# Patient Record
Sex: FEMALE | Race: WHITE | NOT HISPANIC OR LATINO | Employment: UNEMPLOYED | URBAN - METROPOLITAN AREA
[De-identification: names, ages, dates, MRNs, and addresses within clinical notes are randomized per-mention and may not be internally consistent; named-entity substitution may affect disease eponyms.]

---

## 2024-07-01 ENCOUNTER — HOSPITAL ENCOUNTER (INPATIENT)
Facility: CLINIC | Age: 44
LOS: 3 days | Discharge: HOME | End: 2024-07-04
Attending: EMERGENCY MEDICINE | Admitting: STUDENT IN AN ORGANIZED HEALTH CARE EDUCATION/TRAINING PROGRAM
Payer: MEDICAID

## 2024-07-01 ENCOUNTER — APPOINTMENT (OUTPATIENT)
Dept: RADIOLOGY | Facility: CLINIC | Age: 44
End: 2024-07-01
Attending: EMERGENCY MEDICINE
Payer: MEDICAID

## 2024-07-01 DIAGNOSIS — J96.02 ACUTE RESPIRATORY FAILURE WITH HYPOXIA AND HYPERCAPNIA (H): ICD-10-CM

## 2024-07-01 DIAGNOSIS — T78.3XXS ANGIOEDEMA, SEQUELA: Primary | ICD-10-CM

## 2024-07-01 DIAGNOSIS — J45.901 EXACERBATION OF ASTHMA, UNSPECIFIED ASTHMA SEVERITY, UNSPECIFIED WHETHER PERSISTENT: ICD-10-CM

## 2024-07-01 DIAGNOSIS — M54.9 BACK PAIN, UNSPECIFIED BACK LOCATION, UNSPECIFIED BACK PAIN LATERALITY, UNSPECIFIED CHRONICITY: ICD-10-CM

## 2024-07-01 DIAGNOSIS — J96.01 ACUTE RESPIRATORY FAILURE WITH HYPOXIA AND HYPERCAPNIA (H): ICD-10-CM

## 2024-07-01 DIAGNOSIS — F41.9 ANXIETY: ICD-10-CM

## 2024-07-01 LAB
ALBUMIN SERPL BCG-MCNC: 4.1 G/DL (ref 3.5–5.2)
ALP SERPL-CCNC: 87 U/L (ref 40–150)
ALT SERPL W P-5'-P-CCNC: 19 U/L (ref 0–50)
ANION GAP SERPL CALCULATED.3IONS-SCNC: 9 MMOL/L (ref 7–15)
AST SERPL W P-5'-P-CCNC: 28 U/L (ref 0–45)
BASE EXCESS BLDV CALC-SCNC: 6.7 MMOL/L (ref -3–3)
BASOPHILS # BLD AUTO: 0 10E3/UL (ref 0–0.2)
BASOPHILS NFR BLD AUTO: 1 %
BILIRUB SERPL-MCNC: 0.5 MG/DL
BUN SERPL-MCNC: 11.5 MG/DL (ref 6–20)
CALCIUM SERPL-MCNC: 9.4 MG/DL (ref 8.6–10)
CHLORIDE SERPL-SCNC: 101 MMOL/L (ref 98–107)
CREAT SERPL-MCNC: 1.2 MG/DL (ref 0.51–0.95)
CRP SERPL-MCNC: 9.29 MG/L
DEPRECATED HCO3 PLAS-SCNC: 30 MMOL/L (ref 22–29)
EGFRCR SERPLBLD CKD-EPI 2021: 57 ML/MIN/1.73M2
EOSINOPHIL # BLD AUTO: 0.6 10E3/UL (ref 0–0.7)
EOSINOPHIL NFR BLD AUTO: 9 %
ERYTHROCYTE [DISTWIDTH] IN BLOOD BY AUTOMATED COUNT: 12.9 % (ref 10–15)
FLUAV RNA SPEC QL NAA+PROBE: NEGATIVE
FLUBV RNA RESP QL NAA+PROBE: NEGATIVE
GLUCOSE BLDC GLUCOMTR-MCNC: 225 MG/DL (ref 70–99)
GLUCOSE SERPL-MCNC: 130 MG/DL (ref 70–99)
HBA1C MFR BLD: 6.8 %
HCG SERPL QL: NEGATIVE
HCO3 BLDV-SCNC: 33 MMOL/L (ref 21–28)
HCT VFR BLD AUTO: 45.8 % (ref 35–47)
HGB BLD-MCNC: 15.3 G/DL (ref 11.7–15.7)
IMM GRANULOCYTES # BLD: 0 10E3/UL
IMM GRANULOCYTES NFR BLD: 0 %
LYMPHOCYTES # BLD AUTO: 1.9 10E3/UL (ref 0.8–5.3)
LYMPHOCYTES NFR BLD AUTO: 31 %
MCH RBC QN AUTO: 30.5 PG (ref 26.5–33)
MCHC RBC AUTO-ENTMCNC: 33.4 G/DL (ref 31.5–36.5)
MCV RBC AUTO: 91 FL (ref 78–100)
MONOCYTES # BLD AUTO: 0.4 10E3/UL (ref 0–1.3)
MONOCYTES NFR BLD AUTO: 6 %
NEUTROPHILS # BLD AUTO: 3.3 10E3/UL (ref 1.6–8.3)
NEUTROPHILS NFR BLD AUTO: 53 %
NRBC # BLD AUTO: 0 10E3/UL
NRBC BLD AUTO-RTO: 0 /100
NT-PROBNP SERPL-MCNC: <36 PG/ML (ref 0–450)
O2/TOTAL GAS SETTING VFR VENT: 0 %
OXYHGB MFR BLDV: 43 % (ref 70–75)
PCO2 BLDV: 63 MM HG (ref 40–50)
PH BLDV: 7.32 [PH] (ref 7.32–7.43)
PLATELET # BLD AUTO: 281 10E3/UL (ref 150–450)
PO2 BLDV: 26 MM HG (ref 25–47)
POTASSIUM SERPL-SCNC: 3.9 MMOL/L (ref 3.4–5.3)
PROT SERPL-MCNC: 7.1 G/DL (ref 6.4–8.3)
RBC # BLD AUTO: 5.02 10E6/UL (ref 3.8–5.2)
RSV RNA SPEC NAA+PROBE: NEGATIVE
SAO2 % BLDV: 44.1 % (ref 70–75)
SARS-COV-2 RNA RESP QL NAA+PROBE: NEGATIVE
SODIUM SERPL-SCNC: 140 MMOL/L (ref 135–145)
T4 FREE SERPL-MCNC: 0.91 NG/DL (ref 0.9–1.7)
TROPONIN T SERPL HS-MCNC: 9 NG/L
TSH SERPL DL<=0.005 MIU/L-ACNC: 44.8 UIU/ML (ref 0.3–4.2)
WBC # BLD AUTO: 6.2 10E3/UL (ref 4–11)

## 2024-07-01 PROCEDURE — 85025 COMPLETE CBC W/AUTO DIFF WBC: CPT | Performed by: EMERGENCY MEDICINE

## 2024-07-01 PROCEDURE — 250N000013 HC RX MED GY IP 250 OP 250 PS 637: Performed by: STUDENT IN AN ORGANIZED HEALTH CARE EDUCATION/TRAINING PROGRAM

## 2024-07-01 PROCEDURE — 3E043XZ INTRODUCTION OF VASOPRESSOR INTO CENTRAL VEIN, PERCUTANEOUS APPROACH: ICD-10-PCS | Performed by: STUDENT IN AN ORGANIZED HEALTH CARE EDUCATION/TRAINING PROGRAM

## 2024-07-01 PROCEDURE — 83036 HEMOGLOBIN GLYCOSYLATED A1C: CPT | Performed by: STUDENT IN AN ORGANIZED HEALTH CARE EDUCATION/TRAINING PROGRAM

## 2024-07-01 PROCEDURE — 250N000012 HC RX MED GY IP 250 OP 636 PS 637: Performed by: STUDENT IN AN ORGANIZED HEALTH CARE EDUCATION/TRAINING PROGRAM

## 2024-07-01 PROCEDURE — 84443 ASSAY THYROID STIM HORMONE: CPT | Performed by: STUDENT IN AN ORGANIZED HEALTH CARE EDUCATION/TRAINING PROGRAM

## 2024-07-01 PROCEDURE — 96365 THER/PROPH/DIAG IV INF INIT: CPT

## 2024-07-01 PROCEDURE — 83880 ASSAY OF NATRIURETIC PEPTIDE: CPT | Performed by: EMERGENCY MEDICINE

## 2024-07-01 PROCEDURE — 250N000011 HC RX IP 250 OP 636: Performed by: EMERGENCY MEDICINE

## 2024-07-01 PROCEDURE — 84439 ASSAY OF FREE THYROXINE: CPT | Performed by: STUDENT IN AN ORGANIZED HEALTH CARE EDUCATION/TRAINING PROGRAM

## 2024-07-01 PROCEDURE — 82040 ASSAY OF SERUM ALBUMIN: CPT | Performed by: EMERGENCY MEDICINE

## 2024-07-01 PROCEDURE — 71045 X-RAY EXAM CHEST 1 VIEW: CPT

## 2024-07-01 PROCEDURE — 258N000003 HC RX IP 258 OP 636: Performed by: EMERGENCY MEDICINE

## 2024-07-01 PROCEDURE — 82962 GLUCOSE BLOOD TEST: CPT

## 2024-07-01 PROCEDURE — 96375 TX/PRO/DX INJ NEW DRUG ADDON: CPT

## 2024-07-01 PROCEDURE — 96360 HYDRATION IV INFUSION INIT: CPT | Mod: 59

## 2024-07-01 PROCEDURE — 82805 BLOOD GASES W/O2 SATURATION: CPT | Performed by: EMERGENCY MEDICINE

## 2024-07-01 PROCEDURE — 250N000009 HC RX 250: Performed by: EMERGENCY MEDICINE

## 2024-07-01 PROCEDURE — 94640 AIRWAY INHALATION TREATMENT: CPT

## 2024-07-01 PROCEDURE — 86140 C-REACTIVE PROTEIN: CPT | Performed by: STUDENT IN AN ORGANIZED HEALTH CARE EDUCATION/TRAINING PROGRAM

## 2024-07-01 PROCEDURE — 87637 SARSCOV2&INF A&B&RSV AMP PRB: CPT | Performed by: EMERGENCY MEDICINE

## 2024-07-01 PROCEDURE — 84484 ASSAY OF TROPONIN QUANT: CPT | Performed by: EMERGENCY MEDICINE

## 2024-07-01 PROCEDURE — 999N000157 HC STATISTIC RCP TIME EA 10 MIN

## 2024-07-01 PROCEDURE — 99223 1ST HOSP IP/OBS HIGH 75: CPT | Performed by: STUDENT IN AN ORGANIZED HEALTH CARE EDUCATION/TRAINING PROGRAM

## 2024-07-01 PROCEDURE — 99285 EMERGENCY DEPT VISIT HI MDM: CPT

## 2024-07-01 PROCEDURE — 36415 COLL VENOUS BLD VENIPUNCTURE: CPT | Performed by: EMERGENCY MEDICINE

## 2024-07-01 PROCEDURE — 210N000002 HC R&B HEART CARE

## 2024-07-01 PROCEDURE — 96361 HYDRATE IV INFUSION ADD-ON: CPT

## 2024-07-01 PROCEDURE — 93005 ELECTROCARDIOGRAM TRACING: CPT | Performed by: EMERGENCY MEDICINE

## 2024-07-01 PROCEDURE — 84703 CHORIONIC GONADOTROPIN ASSAY: CPT | Performed by: EMERGENCY MEDICINE

## 2024-07-01 PROCEDURE — 250N000013 HC RX MED GY IP 250 OP 250 PS 637: Performed by: EMERGENCY MEDICINE

## 2024-07-01 RX ORDER — ONDANSETRON 2 MG/ML
4 INJECTION INTRAMUSCULAR; INTRAVENOUS EVERY 6 HOURS PRN
Status: DISCONTINUED | OUTPATIENT
Start: 2024-07-01 | End: 2024-07-04 | Stop reason: HOSPADM

## 2024-07-01 RX ORDER — DEXTROSE MONOHYDRATE 25 G/50ML
25-50 INJECTION, SOLUTION INTRAVENOUS
Status: DISCONTINUED | OUTPATIENT
Start: 2024-07-01 | End: 2024-07-04 | Stop reason: HOSPADM

## 2024-07-01 RX ORDER — ONDANSETRON 4 MG/1
4 TABLET, ORALLY DISINTEGRATING ORAL EVERY 6 HOURS PRN
Status: DISCONTINUED | OUTPATIENT
Start: 2024-07-01 | End: 2024-07-04 | Stop reason: HOSPADM

## 2024-07-01 RX ORDER — LEVOTHYROXINE SODIUM 300 MCG
300 TABLET ORAL DAILY
COMMUNITY
Start: 2023-07-21

## 2024-07-01 RX ORDER — DIPHENHYDRAMINE HCL 25 MG
25-50 TABLET ORAL EVERY 6 HOURS PRN
COMMUNITY

## 2024-07-01 RX ORDER — AMOXICILLIN 250 MG
1 CAPSULE ORAL 2 TIMES DAILY PRN
Status: DISCONTINUED | OUTPATIENT
Start: 2024-07-01 | End: 2024-07-04 | Stop reason: HOSPADM

## 2024-07-01 RX ORDER — NICOTINE POLACRILEX 4 MG
15-30 LOZENGE BUCCAL
Status: DISCONTINUED | OUTPATIENT
Start: 2024-07-01 | End: 2024-07-04 | Stop reason: HOSPADM

## 2024-07-01 RX ORDER — ONDANSETRON 4 MG/1
4 TABLET, FILM COATED ORAL ONCE
Status: COMPLETED | OUTPATIENT
Start: 2024-07-01 | End: 2024-07-01

## 2024-07-01 RX ORDER — MAGNESIUM SULFATE HEPTAHYDRATE 40 MG/ML
2 INJECTION, SOLUTION INTRAVENOUS ONCE
Status: COMPLETED | OUTPATIENT
Start: 2024-07-01 | End: 2024-07-01

## 2024-07-01 RX ORDER — ALBUTEROL SULFATE 0.83 MG/ML
SOLUTION RESPIRATORY (INHALATION)
Status: COMPLETED
Start: 2024-07-01 | End: 2024-07-01

## 2024-07-01 RX ORDER — DIPHENHYDRAMINE HYDROCHLORIDE 50 MG/ML
25 INJECTION INTRAMUSCULAR; INTRAVENOUS EVERY 6 HOURS PRN
Status: DISCONTINUED | OUTPATIENT
Start: 2024-07-01 | End: 2024-07-02

## 2024-07-01 RX ORDER — HYDROXYZINE HYDROCHLORIDE 25 MG/1
25-50 TABLET, FILM COATED ORAL EVERY 6 HOURS PRN
COMMUNITY
Start: 2024-03-19

## 2024-07-01 RX ORDER — IBUPROFEN 800 MG/1
800 TABLET, FILM COATED ORAL EVERY 8 HOURS PRN
Status: ON HOLD | COMMUNITY
End: 2024-07-04

## 2024-07-01 RX ORDER — CETIRIZINE HYDROCHLORIDE 10 MG/1
20 TABLET ORAL 2 TIMES DAILY
COMMUNITY
Start: 2024-03-19 | End: 2025-03-19

## 2024-07-01 RX ORDER — IPRATROPIUM BROMIDE AND ALBUTEROL SULFATE 2.5; .5 MG/3ML; MG/3ML
SOLUTION RESPIRATORY (INHALATION)
Status: COMPLETED
Start: 2024-07-01 | End: 2024-07-01

## 2024-07-01 RX ORDER — FAMOTIDINE 20 MG/1
40 TABLET, FILM COATED ORAL 2 TIMES DAILY
Status: DISCONTINUED | OUTPATIENT
Start: 2024-07-01 | End: 2024-07-02

## 2024-07-01 RX ORDER — SENNOSIDES 8.6 MG
650 CAPSULE ORAL EVERY MORNING
COMMUNITY

## 2024-07-01 RX ORDER — IPRATROPIUM BROMIDE AND ALBUTEROL SULFATE 2.5; .5 MG/3ML; MG/3ML
1 SOLUTION RESPIRATORY (INHALATION) EVERY 6 HOURS PRN
COMMUNITY
Start: 2023-09-08

## 2024-07-01 RX ORDER — EPINEPHRINE 0.3 MG/.3ML
0.3 INJECTION SUBCUTANEOUS PRN
Status: ON HOLD | COMMUNITY
Start: 2022-09-25 | End: 2024-07-04

## 2024-07-01 RX ORDER — MONTELUKAST SODIUM 10 MG/1
10 TABLET ORAL
COMMUNITY
Start: 2023-08-08

## 2024-07-01 RX ORDER — METHYLPREDNISOLONE SODIUM SUCCINATE 125 MG/2ML
125 INJECTION, POWDER, LYOPHILIZED, FOR SOLUTION INTRAMUSCULAR; INTRAVENOUS ONCE
Status: COMPLETED | OUTPATIENT
Start: 2024-07-01 | End: 2024-07-01

## 2024-07-01 RX ORDER — DIPHENHYDRAMINE HCL 50 MG
50 CAPSULE ORAL EVERY 6 HOURS PRN
Status: DISCONTINUED | OUTPATIENT
Start: 2024-07-01 | End: 2024-07-02

## 2024-07-01 RX ORDER — HYDROXYZINE HYDROCHLORIDE 25 MG/1
25-50 TABLET, FILM COATED ORAL EVERY 6 HOURS PRN
Status: DISCONTINUED | OUTPATIENT
Start: 2024-07-01 | End: 2024-07-04 | Stop reason: HOSPADM

## 2024-07-01 RX ORDER — CARVEDILOL 6.25 MG/1
12.5 TABLET ORAL 2 TIMES DAILY WITH MEALS
Status: DISCONTINUED | OUTPATIENT
Start: 2024-07-01 | End: 2024-07-04 | Stop reason: HOSPADM

## 2024-07-01 RX ORDER — VITAMIN B COMPLEX
1 TABLET ORAL DAILY
COMMUNITY

## 2024-07-01 RX ORDER — PREDNISONE 20 MG/1
40 TABLET ORAL DAILY
Status: DISCONTINUED | OUTPATIENT
Start: 2024-07-01 | End: 2024-07-04 | Stop reason: HOSPADM

## 2024-07-01 RX ORDER — CETIRIZINE HYDROCHLORIDE 10 MG/1
20 TABLET ORAL 2 TIMES DAILY
Status: DISCONTINUED | OUTPATIENT
Start: 2024-07-01 | End: 2024-07-04 | Stop reason: HOSPADM

## 2024-07-01 RX ORDER — DIPHENHYDRAMINE HCL 25 MG
25-50 TABLET ORAL EVERY 6 HOURS PRN
Status: DISCONTINUED | OUTPATIENT
Start: 2024-07-01 | End: 2024-07-01

## 2024-07-01 RX ORDER — ALBUTEROL SULFATE 90 UG/1
1-2 AEROSOL, METERED RESPIRATORY (INHALATION) EVERY 4 HOURS PRN
Status: DISCONTINUED | OUTPATIENT
Start: 2024-07-01 | End: 2024-07-04 | Stop reason: HOSPADM

## 2024-07-01 RX ORDER — DULAGLUTIDE 1.5 MG/.5ML
1.5 INJECTION, SOLUTION SUBCUTANEOUS
COMMUNITY
Start: 2024-05-24 | End: 2024-07-09

## 2024-07-01 RX ORDER — TRIAMTERENE AND HYDROCHLOROTHIAZIDE 37.5; 25 MG/1; MG/1
1 CAPSULE ORAL DAILY
COMMUNITY
Start: 2024-03-19

## 2024-07-01 RX ORDER — AMOXICILLIN 250 MG
2 CAPSULE ORAL 2 TIMES DAILY PRN
Status: DISCONTINUED | OUTPATIENT
Start: 2024-07-01 | End: 2024-07-04 | Stop reason: HOSPADM

## 2024-07-01 RX ORDER — DIPHENHYDRAMINE HYDROCHLORIDE 50 MG/ML
INJECTION INTRAMUSCULAR; INTRAVENOUS
Status: COMPLETED
Start: 2024-07-01 | End: 2024-07-01

## 2024-07-01 RX ORDER — CARVEDILOL 12.5 MG/1
12.5 TABLET ORAL 2 TIMES DAILY WITH MEALS
COMMUNITY
Start: 2024-03-19 | End: 2025-03-19

## 2024-07-01 RX ORDER — ALBUTEROL SULFATE 0.83 MG/ML
5 SOLUTION RESPIRATORY (INHALATION) ONCE
Status: DISCONTINUED | OUTPATIENT
Start: 2024-07-01 | End: 2024-07-01

## 2024-07-01 RX ORDER — IPRATROPIUM BROMIDE AND ALBUTEROL SULFATE 2.5; .5 MG/3ML; MG/3ML
1 SOLUTION RESPIRATORY (INHALATION) EVERY 6 HOURS PRN
Status: DISCONTINUED | OUTPATIENT
Start: 2024-07-01 | End: 2024-07-04 | Stop reason: HOSPADM

## 2024-07-01 RX ORDER — ACETAMINOPHEN 325 MG/1
650 TABLET ORAL EVERY MORNING
Status: DISCONTINUED | OUTPATIENT
Start: 2024-07-02 | End: 2024-07-04 | Stop reason: HOSPADM

## 2024-07-01 RX ORDER — FAMOTIDINE 40 MG/1
40 TABLET, FILM COATED ORAL 2 TIMES DAILY
COMMUNITY
Start: 2024-03-19

## 2024-07-01 RX ORDER — MAGNESIUM OXIDE 400 MG/1
400 TABLET ORAL DAILY
COMMUNITY

## 2024-07-01 RX ORDER — DULAGLUTIDE 3 MG/.5ML
3 INJECTION, SOLUTION SUBCUTANEOUS
COMMUNITY
Start: 2024-07-11 | End: 2024-08-10

## 2024-07-01 RX ORDER — ALBUTEROL SULFATE 90 UG/1
1-2 AEROSOL, METERED RESPIRATORY (INHALATION) EVERY 4 HOURS PRN
COMMUNITY
Start: 2023-10-12

## 2024-07-01 RX ORDER — MONTELUKAST SODIUM 10 MG/1
10 TABLET ORAL
Status: DISCONTINUED | OUTPATIENT
Start: 2024-07-02 | End: 2024-07-04 | Stop reason: HOSPADM

## 2024-07-01 RX ORDER — TRIAMCINOLONE ACETONIDE 1 MG/G
1 CREAM TOPICAL 2 TIMES DAILY PRN
COMMUNITY
Start: 2022-12-01

## 2024-07-01 RX ORDER — LEVOTHYROXINE SODIUM 100 UG/1
300 TABLET ORAL DAILY
Status: DISCONTINUED | OUTPATIENT
Start: 2024-07-02 | End: 2024-07-04 | Stop reason: HOSPADM

## 2024-07-01 RX ORDER — INSULIN HUMAN 100 [IU]/ML
10-20 INJECTION, SUSPENSION SUBCUTANEOUS
COMMUNITY
Start: 2024-03-25

## 2024-07-01 RX ORDER — ONDANSETRON 4 MG/1
4 TABLET, ORALLY DISINTEGRATING ORAL EVERY 8 HOURS PRN
COMMUNITY
Start: 2024-06-11

## 2024-07-01 RX ADMIN — CETIRIZINE HYDROCHLORIDE 20 MG: 10 TABLET, FILM COATED ORAL at 21:24

## 2024-07-01 RX ADMIN — IPRATROPIUM BROMIDE AND ALBUTEROL SULFATE 3 ML: .5; 3 SOLUTION RESPIRATORY (INHALATION) at 15:28

## 2024-07-01 RX ADMIN — PREDNISONE 40 MG: 20 TABLET ORAL at 21:24

## 2024-07-01 RX ADMIN — ONDANSETRON HYDROCHLORIDE 4 MG: 4 TABLET, FILM COATED ORAL at 16:03

## 2024-07-01 RX ADMIN — MAGNESIUM SULFATE HEPTAHYDRATE 2 G: 40 INJECTION, SOLUTION INTRAVENOUS at 15:40

## 2024-07-01 RX ADMIN — FAMOTIDINE 40 MG: 20 TABLET, FILM COATED ORAL at 21:24

## 2024-07-01 RX ADMIN — ALBUTEROL SULFATE 2.5 MG: 2.5 SOLUTION RESPIRATORY (INHALATION) at 15:40

## 2024-07-01 RX ADMIN — CARVEDILOL 12.5 MG: 6.25 TABLET, FILM COATED ORAL at 21:24

## 2024-07-01 RX ADMIN — RIVAROXABAN 20 MG: 10 TABLET, FILM COATED ORAL at 21:24

## 2024-07-01 RX ADMIN — SODIUM CHLORIDE, POTASSIUM CHLORIDE, SODIUM LACTATE AND CALCIUM CHLORIDE 1000 ML: 600; 310; 30; 20 INJECTION, SOLUTION INTRAVENOUS at 15:56

## 2024-07-01 RX ADMIN — RACEPINEPHRINE HYDROCHLORIDE 0.5 ML: 11.25 SOLUTION RESPIRATORY (INHALATION) at 15:29

## 2024-07-01 RX ADMIN — DIPHENHYDRAMINE HYDROCHLORIDE 50 MG: 50 INJECTION INTRAMUSCULAR; INTRAVENOUS at 15:30

## 2024-07-01 RX ADMIN — INSULIN ASPART 1 UNITS: 100 INJECTION, SOLUTION INTRAVENOUS; SUBCUTANEOUS at 22:00

## 2024-07-01 RX ADMIN — METHYLPREDNISOLONE SODIUM SUCCINATE 125 MG: 125 INJECTION, POWDER, FOR SOLUTION INTRAMUSCULAR; INTRAVENOUS at 15:35

## 2024-07-01 ASSESSMENT — ACTIVITIES OF DAILY LIVING (ADL)
ADLS_ACUITY_SCORE: 35

## 2024-07-01 ASSESSMENT — COLUMBIA-SUICIDE SEVERITY RATING SCALE - C-SSRS
1. IN THE PAST MONTH, HAVE YOU WISHED YOU WERE DEAD OR WISHED YOU COULD GO TO SLEEP AND NOT WAKE UP?: NO
2. HAVE YOU ACTUALLY HAD ANY THOUGHTS OF KILLING YOURSELF IN THE PAST MONTH?: NO
6. HAVE YOU EVER DONE ANYTHING, STARTED TO DO ANYTHING, OR PREPARED TO DO ANYTHING TO END YOUR LIFE?: NO

## 2024-07-01 NOTE — ED NOTES
Pt ambulated to bathroom with SBAx1. Pt ambulated well, complained of generalized weakness and grogginess. Denies SOB or difficulty breathing.

## 2024-07-01 NOTE — ED TRIAGE NOTES
Pt arrives via EMS. Per EMS pt called 911 from car pulled over on the road. Pt on roadtrip when she suddenly developed SOB. Hx angioedema. Pt panicky and spitting/vomiting secretions. MD at bedside in triage.     Triage Assessment (Adult)       Row Name 07/01/24 1549          Triage Assessment    Airway WDL X;airway symptoms     Airway Symptoms stridor     Airway Interventions suctioned secretions/vomitus        Respiratory WDL    Respiratory WDL X;rhythm/pattern     Rhythm/Pattern, Respiratory shortness of breath;tachypneic        Skin Circulation/Temperature WDL    Skin Circulation/Temperature WDL WDL        Cardiac WDL    Cardiac WDL X  HTN        Peripheral/Neurovascular WDL    Peripheral Neurovascular WDL WDL        Cognitive/Neuro/Behavioral WDL    Cognitive/Neuro/Behavioral WDL WDL

## 2024-07-01 NOTE — PROGRESS NOTES
Pt placed on O2 2L, sating 92%, RR 19, BS wheezing, frequent cough, productive, increased aeration post nebs. Pt received Duoneb, racepinephrine, and albuterol neb.     Kenneth Sibley, RT

## 2024-07-01 NOTE — ED PROVIDER NOTES
EMERGENCY DEPARTMENT ENCOUNTER      NAME: Aniyah Delgado  AGE: 44 year old female  YOB: 1980  MRN: [id number]  EVALUATION DATE & TIME: 2024  3:20 PM    PCP: No primary care provider on file.    ED PROVIDER: Stefany Wilkins M.D.      Chief Complaint   Patient presents with    Shortness of Breath       FINAL IMPRESSION:  1. Exacerbation of asthma, unspecified asthma severity, unspecified whether persistent    2. Acute respiratory failure with hypoxia and hypercapnia (H)        ED COURSE & MEDICAL DECISION MAKIN. Shortness of breath.  Differntials include asthma, reactive airway disease, angioedema, medication reaction, viral illness.  No ace inhibitors on board per friend. Wheezing plus some angioedema to uvula and palatine tonsils but no tongue or lip involvement.  Racemic epi, albuterol, DuoNeb, methylprednisolone, IV Benadryl, IV magnesium ordered.  EKG performed.  Chest x-ray ordered.  I reviewed chest x-ray and EKG myself.  I ordered and reviewed blood work myself, essentially negative troponin.  Negative chest x-ray.  Sinus rhythm on EKG.  Still significantly wheezing and requiring 2 L of oxygen.  I spoke to registration, they are registering her now and that is why previous visits are not available.  She typically sees [facility] in [place].  Patient denies any history of asthma exacerbation requiring intubation.  I suspect she has some sleep apnea resulting in CO2 retention reflected in the VBG.  I will admit patient to medicine given requirement of 2 L nasal cannula oxygen, continued wheezing.      3:23 PM I met with the patient to gather history and to perform my initial exam. I discussed the plan for care while in the Emergency Department.  3:50 PM I reevaluated the patient. She states feeling improved.   5:20 PM I reexamined the patient. She is still wheezing but workup breathing improved. She has a history of asthma. No recent illnesses. Sleep study ordered by [facility] in [place].  She sees [facility] but has not had the sleep study yet.   6:20 PM I spoke with Dr. Red, hospitalist        Pertinent Labs & Imaging studies reviewed. (See chart for details).    Medical Decision Making  Obtained supplemental history:Supplemental history obtained?: Documented in chart, EMS, and Friend  Reviewed external records: External records reviewed?: Documented in chart and Other: Medical chart not uploaded before patient was admitted due to lack registration.  I did discuss past medical history with patient, she states all her care is with [facility] in [facility].  Care impacted by chronic illness:Other: asthma  Care significantly affected by social determinants of health:Access to Medical Care  Did you consider but not order tests?: Work up considered but not performed and documented in chart, if applicable I considered CT PE but deferred this test due to clear asthma exacerbation causing significant shortness of breath with improvement after treatment with DuoNeb and albuterol along with IV meds.  Did you interpret images independently?: Independent interpretation of ECG and images noted in documentation, when applicable.  Consultation discussion with other provider:Did you involve another provider (consultant, , pharmacy, etc.)?: I discussed the care with another health care provider, see documentation for details.  Admit.    At the conclusion of the encounter I discussed the results of all of the tests and the disposition. The questions were answered. The patient or family acknowledged understanding and was agreeable with the care plan.      CRITICAL CARE:  Performed by: Stefany Wilkins  Authorized by: Stefany Wilkins  Total critical care time: 40 minutes  Critical care time was exclusive of separately billable procedures and treating other patients.  Critical care was necessary to treat or prevent imminent or life-threatening deterioration of the following conditions: Acute respiratory failure with  "hypoxemia  Critical care was time spent personally by me on the following activities: development of treatment plan with patient or surrogate, discussions with consultants, examination of patient, evaluation of patient's response to treatment, obtaining history from patient or surrogate, ordering and performing treatments and interventions, ordering and review of laboratory studies, ordering and review of radiographic studies and re-evaluation of patient's condition, this excludes any separately billable procedures.    HPI    Patient information was obtained from: EMS, friend, and the patient     Use of : N/A .       Aniyah Delgado is a 44 year old female who presents to the ED by EMS for evaluation of shortness of breath.    Per friend, the patient had an shortness of breath that became worse after taking a medication about 25 minutes ago (~3:00 PM) along with \"spitting up red stuff.\" She was having raspy breaths, soreness, fatigue, and cough before the worsening shortness of breath. Patient does not smoke. She has allergies to fish but did not have any. They are on a road trip from [place].     Per EMS, the patient receive 2 epi pen injections.     Per patient, she has a medical history of asthma.  She denies any history of intubations in the past.      REVIEW OF SYSTEMS  All other systems negative unless noted in HPI.    PAST MEDICAL HISTORY:  History reviewed. No pertinent past medical history.    PAST SURGICAL HISTORY:  History reviewed. No pertinent surgical history.      CURRENT MEDICATIONS:    Current Facility-Administered Medications   Medication Dose Route Frequency Provider Last Rate Last Admin    acetaminophen (TYLENOL) tablet 650 mg  650 mg Oral QAM Hero Red MD   650 mg at 07/02/24 0807    albuterol (PROVENTIL HFA/VENTOLIN HFA) inhaler  1-2 puff Inhalation Q4H PRN Hero Red MD        carvedilol (COREG) tablet 12.5 mg  12.5 mg Oral BID w/meals Hero Red MD   12.5 mg at " 07/02/24 0807    cetirizine (zyrTEC) tablet 20 mg  20 mg Oral BID Hero Red MD   20 mg at 07/02/24 0807    glucose gel 15-30 g  15-30 g Oral Q15 Min PRN Hero Red MD        Or    dextrose 50 % injection 25-50 mL  25-50 mL Intravenous Q15 Min PRN Hero Red MD        Or    glucagon injection 1 mg  1 mg Subcutaneous Q15 Min PRN Hero Red MD        diphenhydrAMINE (BENADRYL) capsule 50 mg  50 mg Oral Q6H PRN Stefany Wilkins MD        Or    diphenhydrAMINE (BENADRYL) injection 25 mg  25 mg Intravenous Q6H PRN Stefany Wilkins MD   25 mg at 07/02/24 1148    famotidine (PEPCID) tablet 40 mg  40 mg Oral BID Hero Red MD   40 mg at 07/02/24 0807    hydrOXYzine HCl (ATARAX) tablet 25-50 mg  25-50 mg Oral Q6H PRN Hero Red MD   50 mg at 07/02/24 1148    insulin aspart (NovoLOG) injection (RAPID ACTING)  1-7 Units Subcutaneous TID  Hero Red MD   3 Units at 07/02/24 1303    insulin aspart (NovoLOG) injection (RAPID ACTING)  1-5 Units Subcutaneous At Bedtime Hero Red MD   1 Units at 07/01/24 2200    ipratropium - albuterol 0.5 mg/2.5 mg/3 mL (DUONEB) neb solution 3 mL  1 vial Nebulization Q6H PRN Hero Red MD        levothyroxine (SYNTHROID/LEVOTHROID) tablet 300 mcg  300 mcg Oral Daily Hero Red MD   300 mcg at 07/02/24 0807    montelukast (SINGULAIR) tablet 10 mg  10 mg Oral QAM  Hero Red MD   10 mg at 07/02/24 0638    naloxone (NARCAN) injection 0.2 mg  0.2 mg Intravenous Q2 Min PRN Hero Red MD        Or    naloxone (NARCAN) injection 0.4 mg  0.4 mg Intravenous Q2 Min PRN Hero Red MD        Or    naloxone (NARCAN) injection 0.2 mg  0.2 mg Intramuscular Q2 Min PRN Hero Red MD        Or    naloxone (NARCAN) injection 0.4 mg  0.4 mg Intramuscular Q2 Min PRN Hero Red MD        ondansetron (ZOFRAN ODT) ODT tab 4 mg  4 mg Oral Q6H PRN Hero Red MD   4 mg at 07/02/24 1038    Or    ondansetron (ZOFRAN) injection 4 mg  4 mg  Intravenous Q6H PRN Hero Red MD        Patient is already receiving anticoagulation with heparin, enoxaparin (LOVENOX), warfarin (COUMADIN)  or other anticoagulant medication   Does not apply Continuous PRN Hero Red MD        predniSONE (DELTASONE) tablet 40 mg  40 mg Oral Daily Hero Red MD   40 mg at 07/02/24 0807    rivaroxaban ANTICOAGULANT (XARELTO) tablet 20 mg  20 mg Oral Daily with supper Hero Red MD   20 mg at 07/01/24 2124    senna-docusate (SENOKOT-S/PERICOLACE) 8.6-50 MG per tablet 1 tablet  1 tablet Oral BID PRN Hero Red MD        Or    senna-docusate (SENOKOT-S/PERICOLACE) 8.6-50 MG per tablet 2 tablet  2 tablet Oral BID PRN Hero Red MD        traMADol (ULTRAM) tablet 50 mg  50 mg Oral Q6H PRN Arvin Bowie DO   50 mg at 07/02/24 0411     Current Outpatient Medications   Medication Sig Dispense Refill    acetaminophen (TYLENOL) 650 MG CR tablet Take 650 mg by mouth every morning      albuterol (PROAIR HFA/PROVENTIL HFA/VENTOLIN HFA) 108 (90 Base) MCG/ACT inhaler Inhale 1-2 puffs into the lungs every 4 hours as needed      carvedilol (COREG) 12.5 MG tablet Take 12.5 mg by mouth 2 times daily (with meals)      cetirizine (ZYRTEC) 10 MG tablet Take 20 mg by mouth 2 times daily      diphenhydrAMINE (BENADRYL) 25 MG tablet Take 25-50 mg by mouth every 6 hours as needed for itching or allergies      dulaglutide (TRULICITY) 1.5 MG/0.5ML pen Inject 1.5 mg Subcutaneous every 7 days      [START ON 7/11/2024] Dulaglutide (TRULICITY) 3 MG/0.5ML SOPN Inject 3 mg Subcutaneous every 7 days      EPINEPHrine (ANY BX GENERIC EQUIV) 0.3 MG/0.3ML injection 2-pack Inject 0.3 mg into the muscle as needed for anaphylaxis      famotidine (PEPCID) 40 MG tablet Take 40 mg by mouth 2 times daily      HUMULIN N KWIKPEN 100 UNIT/ML susp Inject 10-20 Units Subcutaneous 2 times daily (before meals) Inject 10-20 Units under the skin 2 (two) times a day as needed (with meals when taking  "prednisone).      hydrOXYzine HCl (ATARAX) 25 MG tablet Take 25-50 mg by mouth every 6 hours as needed for itching      ibuprofen (ADVIL/MOTRIN) 800 MG tablet Take 800 mg by mouth every 8 hours as needed for moderate pain      ipratropium - albuterol 0.5 mg/2.5 mg/3 mL (DUONEB) 0.5-2.5 (3) MG/3ML neb solution Take 1 vial by nebulization every 6 hours as needed for wheezing or shortness of breath      magnesium oxide 400 MG tablet Take 400 mg by mouth daily      montelukast (SINGULAIR) 10 MG tablet Take 10 mg by mouth daily before breakfast      ondansetron (ZOFRAN ODT) 4 MG ODT tab Take 4 mg by mouth every 8 hours as needed for nausea or vomiting      rivaroxaban ANTICOAGULANT (XARELTO) 20 MG TABS tablet Take 20 mg by mouth daily (with dinner)      SYNTHROID 300 MCG tablet Take 300 mcg by mouth daily      triamcinolone (KENALOG) 0.1 % external cream Apply 1 Application topically 2 times daily as needed (eczema)      triamterene-HCTZ (DYAZIDE) 37.5-25 MG capsule Take 1 capsule by mouth daily      Vitamin D3 (VITAMIN D, CHOLECALCIFEROL,) 25 mcg (1000 units) tablet Take 1 tablet by mouth daily           ALLERGIES:  Allergies   Allergen Reactions    Augmentin [Amoxicillin-Pot Clavulanate]     Lisinopril     Alprazolam Other (See Comments)     Excess sedation    Diltiazem Dizziness       FAMILY HISTORY:  History reviewed. No pertinent family history.    SOCIAL HISTORY:       VITALS:  Patient Vitals for the past 24 hrs:   BP Temp Temp src Pulse Resp SpO2 Height Weight   07/02/24 0753 136/79 97.7  F (36.5  C) Oral 86 18 90 % -- --   07/02/24 0309 111/65 97.5  F (36.4  C) Oral -- 18 98 % -- --   07/01/24 2324 (!) 144/67 97.5  F (36.4  C) Axillary -- 18 90 % -- --   07/01/24 2236 -- -- -- 81 -- 92 % -- --   07/01/24 2229 -- -- -- 84 -- (!) 89 % -- --   07/01/24 2124 (!) 153/94 -- -- 87 -- -- -- --   07/01/24 2122 -- -- -- 91 -- 92 % -- --   07/01/24 2100 -- -- -- -- -- -- 1.6 m (5' 2.99\") --   07/01/24 2000 -- -- -- 84 -- 93 " "% -- --   07/01/24 1948 139/88 -- -- 80 18 93 % -- --   07/01/24 1943 -- -- -- -- -- -- -- 145.2 kg (320 lb)   07/01/24 1935 -- -- -- 83 -- 96 % -- --   07/01/24 1745 136/80 -- -- 74 14 92 % -- --   07/01/24 1645 (!) 140/72 -- -- 71 19 94 % -- --   07/01/24 1630 (!) 146/81 -- -- 73 22 97 % -- --   07/01/24 1548 (!) 158/107 97.1  F (36.2  C) Temporal 75 20 100 % -- --   07/01/24 1528 -- -- -- -- -- 92 % -- --       PHYSICAL EXAM    VITAL SIGNS: /79 (BP Location: Left arm)   Pulse 86   Temp 97.7  F (36.5  C) (Oral)   Resp 18   Ht 1.6 m (5' 2.99\")   Wt 145.2 kg (320 lb)   SpO2 90%   BMI 56.70 kg/m    Physical Exam  Vitals and nursing note reviewed.   Constitutional:       General: She is in acute distress.      Appearance: She is ill-appearing and toxic-appearing.   HENT:      Head: Normocephalic and atraumatic.      Mouth/Throat:      Comments: Mild posterior pharyngeal erythema, edema noted to the uvula and tonsils, soft palate.  Uvula is midline.  Maintaining secretions.  No edema to the tongue or lips.    Eyes:      General: No scleral icterus.        Right eye: No discharge.         Left eye: No discharge.      Pupils: Pupils are equal, round, and reactive to light.   Cardiovascular:      Rate and Rhythm: Regular rhythm. Tachycardia present.   Pulmonary:      Effort: Respiratory distress present.      Breath sounds: Wheezing present.      Comments: Retractions present on initial presentation, good air movement bilaterally.  No stridor.  Abdominal:      General: There is no distension.      Palpations: Abdomen is soft.      Tenderness: There is no abdominal tenderness.   Musculoskeletal:         General: No swelling or deformity.      Cervical back: Neck supple. No rigidity.   Skin:     General: Skin is warm and dry.      Capillary Refill: Capillary refill takes less than 2 seconds.      Findings: No bruising or erythema.   Neurological:      General: No focal deficit present.      Mental Status: She " is oriented to person, place, and time. Mental status is at baseline.   Psychiatric:         Mood and Affect: Mood normal.         Behavior: Behavior normal.         LABS  Labs Ordered and Resulted from Time of ED Arrival to Time of ED Departure   BLOOD GAS VENOUS - Abnormal       Result Value    pH Venous 7.32      pCO2 Venous 63 (*)     pO2 Venous 26      Bicarbonate Venous 33 (*)     Base Excess/Deficit Venous 6.7 (*)     FIO2 0      Oxyhemoglobin Venous 43 (*)     O2 Sat, Venous 44.1 (*)    COMPREHENSIVE METABOLIC PANEL - Abnormal    Sodium 140      Potassium 3.9      Carbon Dioxide (CO2) 30 (*)     Anion Gap 9      Urea Nitrogen 11.5      Creatinine 1.20 (*)     GFR Estimate 57 (*)     Calcium 9.4      Chloride 101      Glucose 130 (*)     Alkaline Phosphatase 87      AST 28      ALT 19      Protein Total 7.1      Albumin 4.1      Bilirubin Total 0.5     GLUCOSE BY METER - Abnormal    GLUCOSE BY METER POCT 225 (*)    CRP INFLAMMATION - Abnormal    CRP Inflammation 9.29 (*)    TSH WITH FREE T4 REFLEX - Abnormal    TSH 44.80 (*)    HEMOGLOBIN A1C - Abnormal    Hemoglobin A1C 6.8 (*)    COMPREHENSIVE METABOLIC PANEL - Abnormal    Sodium 137      Potassium 3.9      Carbon Dioxide (CO2) 25      Anion Gap 12      Urea Nitrogen 12.4      Creatinine 0.91      GFR Estimate 80      Calcium 9.2      Chloride 100      Glucose 239 (*)     Alkaline Phosphatase 80      AST 22      ALT 13      Protein Total 6.8      Albumin 3.9      Bilirubin Total 0.4     GLUCOSE BY METER - Abnormal    GLUCOSE BY METER POCT 213 (*)    CBC WITH PLATELETS AND DIFFERENTIAL - Abnormal    WBC Count 7.0      RBC Count 4.80      Hemoglobin 14.9      Hematocrit 43.5      MCV 91      MCH 31.0      MCHC 34.3      RDW 12.6      Platelet Count 255      % Neutrophils 92      % Lymphocytes 7      % Monocytes 0      % Eosinophils 0      % Basophils 0      % Immature Granulocytes 1      NRBCs per 100 WBC 0      Absolute Neutrophils 6.4      Absolute  Lymphocytes 0.5 (*)     Absolute Monocytes 0.0      Absolute Eosinophils 0.0      Absolute Basophils 0.0      Absolute Immature Granulocytes 0.0      Absolute NRBCs 0.0     GLUCOSE BY METER - Abnormal    GLUCOSE BY METER POCT 234 (*)    HCG QUALITATIVE PREGNANCY - Normal    hCG Serum Qualitative Negative     NT PROBNP INPATIENT - Normal    N terminal Pro BNP Inpatient <36     TROPONIN T, HIGH SENSITIVITY - Normal    Troponin T, High Sensitivity 9     INFLUENZA A/B, RSV, & SARS-COV2 PCR - Normal    Influenza A PCR Negative      Influenza B PCR Negative      RSV PCR Negative      SARS CoV2 PCR Negative     T4 FREE - Normal    Free T4 0.91     CBC WITH PLATELETS AND DIFFERENTIAL    WBC Count 6.2      RBC Count 5.02      Hemoglobin 15.3      Hematocrit 45.8      MCV 91      MCH 30.5      MCHC 33.4      RDW 12.9      Platelet Count 281      % Neutrophils 53      % Lymphocytes 31      % Monocytes 6      % Eosinophils 9      % Basophils 1      % Immature Granulocytes 0      NRBCs per 100 WBC 0      Absolute Neutrophils 3.3      Absolute Lymphocytes 1.9      Absolute Monocytes 0.4      Absolute Eosinophils 0.6      Absolute Basophils 0.0      Absolute Immature Granulocytes 0.0      Absolute NRBCs 0.0     GLUCOSE MONITOR NURSING POCT   GLUCOSE MONITOR NURSING POCT   GLUCOSE MONITOR NURSING POCT   GLUCOSE MONITOR NURSING POCT   GLUCOSE MONITOR NURSING POCT   GLUCOSE MONITOR NURSING POCT         RADIOLOGY  XR Chest Port 1 View   Final Result   IMPRESSION: Negative chest.         I have independently reviewed the above image. See radiology report for detail.      EKG:    NA       PROCEDURES:  N/A      MEDICATIONS GIVEN IN THE EMERGENCY:  Medications   diphenhydrAMINE (BENADRYL) capsule 50 mg ( Oral See Alternative 7/2/24 1148)     Or   diphenhydrAMINE (BENADRYL) injection 25 mg (25 mg Intravenous $Given 7/2/24 1148)   senna-docusate (SENOKOT-S/PERICOLACE) 8.6-50 MG per tablet 1 tablet (has no administration in time range)      Or   senna-docusate (SENOKOT-S/PERICOLACE) 8.6-50 MG per tablet 2 tablet (has no administration in time range)   ondansetron (ZOFRAN ODT) ODT tab 4 mg (4 mg Oral $Given 7/2/24 1038)     Or   ondansetron (ZOFRAN) injection 4 mg ( Intravenous See Alternative 7/2/24 1038)   Patient is already receiving anticoagulation with heparin, enoxaparin (LOVENOX), warfarin (COUMADIN)  or other anticoagulant medication (has no administration in time range)   acetaminophen (TYLENOL) tablet 650 mg (650 mg Oral $Given 7/2/24 0807)   albuterol (PROVENTIL HFA/VENTOLIN HFA) inhaler (has no administration in time range)   carvedilol (COREG) tablet 12.5 mg (12.5 mg Oral $Given 7/2/24 0807)   cetirizine (zyrTEC) tablet 20 mg (20 mg Oral $Given 7/2/24 0807)   famotidine (PEPCID) tablet 40 mg (40 mg Oral $Given 7/2/24 0807)   hydrOXYzine HCl (ATARAX) tablet 25-50 mg (50 mg Oral $Given 7/2/24 1148)   ipratropium - albuterol 0.5 mg/2.5 mg/3 mL (DUONEB) neb solution 3 mL (has no administration in time range)   montelukast (SINGULAIR) tablet 10 mg (10 mg Oral $Given 7/2/24 0638)   rivaroxaban ANTICOAGULANT (XARELTO) tablet 20 mg (20 mg Oral $Given 7/1/24 2124)   levothyroxine (SYNTHROID/LEVOTHROID) tablet 300 mcg (300 mcg Oral $Given 7/2/24 0807)   predniSONE (DELTASONE) tablet 40 mg (40 mg Oral $Given 7/2/24 0807)   glucose gel 15-30 g (has no administration in time range)     Or   dextrose 50 % injection 25-50 mL (has no administration in time range)     Or   glucagon injection 1 mg (has no administration in time range)   insulin aspart (NovoLOG) injection (RAPID ACTING) (3 Units Subcutaneous $Given 7/2/24 1303)   insulin aspart (NovoLOG) injection (RAPID ACTING) (1 Units Subcutaneous $Given 7/1/24 8050)   traMADol (ULTRAM) tablet 50 mg (50 mg Oral $Given 7/2/24 3859)   naloxone (NARCAN) injection 0.2 mg (has no administration in time range)     Or   naloxone (NARCAN) injection 0.4 mg (has no administration in time range)     Or   naloxone  (NARCAN) injection 0.2 mg (has no administration in time range)     Or   naloxone (NARCAN) injection 0.4 mg (has no administration in time range)   racEPINEPHrine 2.25 % neb solution (0.5 mLs  $Given 7/1/24 1529)   ipratropium - albuterol 0.5 mg/2.5 mg/3 mL (DUONEB) 0.5-2.5 (3) MG/3ML neb solution (3 mLs  $Given 7/1/24 1528)   diphenhydrAMINE (BENADRYL) 50 MG/ML injection (50 mg  $Given 7/1/24 1530)   methylPREDNISolone sodium succinate (solu-MEDROL) injection 125 mg (125 mg Intravenous $Given 7/1/24 1535)   magnesium sulfate 2 g in 50 mL sterile water intermittent infusion (0 g Intravenous Stopped 7/1/24 1646)   ondansetron (ZOFRAN) tablet 4 mg (4 mg Oral $Given 7/1/24 1603)   albuterol (PROVENTIL) (2.5 MG/3ML) 0.083% neb solution (2.5 mg  $Given 7/1/24 1540)   lactated ringers BOLUS 1,000 mL (0 mLs Intravenous Stopped 7/1/24 2109)       NEW PRESCRIPTIONS STARTED AT TODAY'S ER VISIT  New Prescriptions    No medications on file        I, Newton Li, am serving as a scribe to document services personally performed by Stefany Wilkins MD, based on my observations and the provider's statements to me.  I, Stefany Wilkins MD, attest that Newton Li is acting in a scribe capacity, has observed my performance of the services and has documented them in accordance with my direction.     Stefany Wilkins MD  Emergency Medicine  [facility] EMERGENCY ROOM  [address] [address] [phone]  Dept: [phone]             Stefany Wilkins MD  07/02/24 5156

## 2024-07-02 LAB
ALBUMIN SERPL BCG-MCNC: 3.9 G/DL (ref 3.5–5.2)
ALP SERPL-CCNC: 80 U/L (ref 40–150)
ALT SERPL W P-5'-P-CCNC: 13 U/L (ref 0–50)
ANION GAP SERPL CALCULATED.3IONS-SCNC: 12 MMOL/L (ref 7–15)
AST SERPL W P-5'-P-CCNC: 22 U/L (ref 0–45)
BASE EXCESS BLDV CALC-SCNC: 2.5 MMOL/L (ref -3–3)
BASOPHILS # BLD AUTO: 0 10E3/UL (ref 0–0.2)
BASOPHILS NFR BLD AUTO: 0 %
BILIRUB SERPL-MCNC: 0.4 MG/DL
BUN SERPL-MCNC: 12.4 MG/DL (ref 6–20)
CALCIUM SERPL-MCNC: 9.2 MG/DL (ref 8.6–10)
CHLORIDE SERPL-SCNC: 100 MMOL/L (ref 98–107)
CREAT SERPL-MCNC: 0.91 MG/DL (ref 0.51–0.95)
DEPRECATED HCO3 PLAS-SCNC: 25 MMOL/L (ref 22–29)
EGFRCR SERPLBLD CKD-EPI 2021: 80 ML/MIN/1.73M2
EOSINOPHIL # BLD AUTO: 0 10E3/UL (ref 0–0.7)
EOSINOPHIL NFR BLD AUTO: 0 %
ERYTHROCYTE [DISTWIDTH] IN BLOOD BY AUTOMATED COUNT: 12.6 % (ref 10–15)
GLUCOSE BLDC GLUCOMTR-MCNC: 213 MG/DL (ref 70–99)
GLUCOSE BLDC GLUCOMTR-MCNC: 234 MG/DL (ref 70–99)
GLUCOSE BLDC GLUCOMTR-MCNC: 245 MG/DL (ref 70–99)
GLUCOSE BLDC GLUCOMTR-MCNC: 271 MG/DL (ref 70–99)
GLUCOSE SERPL-MCNC: 239 MG/DL (ref 70–99)
HCO3 BLDV-SCNC: 27 MMOL/L (ref 21–28)
HCT VFR BLD AUTO: 43.5 % (ref 35–47)
HGB BLD-MCNC: 14.9 G/DL (ref 11.7–15.7)
IMM GRANULOCYTES # BLD: 0 10E3/UL
IMM GRANULOCYTES NFR BLD: 1 %
LYMPHOCYTES # BLD AUTO: 0.5 10E3/UL (ref 0.8–5.3)
LYMPHOCYTES NFR BLD AUTO: 7 %
MCH RBC QN AUTO: 31 PG (ref 26.5–33)
MCHC RBC AUTO-ENTMCNC: 34.3 G/DL (ref 31.5–36.5)
MCV RBC AUTO: 91 FL (ref 78–100)
MONOCYTES # BLD AUTO: 0 10E3/UL (ref 0–1.3)
MONOCYTES NFR BLD AUTO: 0 %
NEUTROPHILS # BLD AUTO: 6.4 10E3/UL (ref 1.6–8.3)
NEUTROPHILS NFR BLD AUTO: 92 %
NRBC # BLD AUTO: 0 10E3/UL
NRBC BLD AUTO-RTO: 0 /100
O2/TOTAL GAS SETTING VFR VENT: 0 %
OXYHGB MFR BLDV: 89 % (ref 70–75)
PCO2 BLDV: 40 MM HG (ref 40–50)
PH BLDV: 7.43 [PH] (ref 7.32–7.43)
PLATELET # BLD AUTO: 255 10E3/UL (ref 150–450)
PO2 BLDV: 55 MM HG (ref 25–47)
POTASSIUM SERPL-SCNC: 3.9 MMOL/L (ref 3.4–5.3)
PROT SERPL-MCNC: 6.8 G/DL (ref 6.4–8.3)
RBC # BLD AUTO: 4.8 10E6/UL (ref 3.8–5.2)
SAO2 % BLDV: 90.6 % (ref 70–75)
SODIUM SERPL-SCNC: 137 MMOL/L (ref 135–145)
WBC # BLD AUTO: 7 10E3/UL (ref 4–11)

## 2024-07-02 PROCEDURE — 250N000011 HC RX IP 250 OP 636: Performed by: STUDENT IN AN ORGANIZED HEALTH CARE EDUCATION/TRAINING PROGRAM

## 2024-07-02 PROCEDURE — 250N000011 HC RX IP 250 OP 636: Performed by: EMERGENCY MEDICINE

## 2024-07-02 PROCEDURE — 36415 COLL VENOUS BLD VENIPUNCTURE: CPT | Performed by: STUDENT IN AN ORGANIZED HEALTH CARE EDUCATION/TRAINING PROGRAM

## 2024-07-02 PROCEDURE — 82962 GLUCOSE BLOOD TEST: CPT

## 2024-07-02 PROCEDURE — 250N000013 HC RX MED GY IP 250 OP 250 PS 637: Performed by: HOSPITALIST

## 2024-07-02 PROCEDURE — 999N000157 HC STATISTIC RCP TIME EA 10 MIN

## 2024-07-02 PROCEDURE — 82805 BLOOD GASES W/O2 SATURATION: CPT | Performed by: STUDENT IN AN ORGANIZED HEALTH CARE EDUCATION/TRAINING PROGRAM

## 2024-07-02 PROCEDURE — 85025 COMPLETE CBC W/AUTO DIFF WBC: CPT | Performed by: STUDENT IN AN ORGANIZED HEALTH CARE EDUCATION/TRAINING PROGRAM

## 2024-07-02 PROCEDURE — 83520 IMMUNOASSAY QUANT NOS NONAB: CPT | Performed by: INTERNAL MEDICINE

## 2024-07-02 PROCEDURE — 99233 SBSQ HOSP IP/OBS HIGH 50: CPT | Performed by: STUDENT IN AN ORGANIZED HEALTH CARE EDUCATION/TRAINING PROGRAM

## 2024-07-02 PROCEDURE — 250N000012 HC RX MED GY IP 250 OP 636 PS 637: Performed by: STUDENT IN AN ORGANIZED HEALTH CARE EDUCATION/TRAINING PROGRAM

## 2024-07-02 PROCEDURE — 250N000013 HC RX MED GY IP 250 OP 250 PS 637: Performed by: STUDENT IN AN ORGANIZED HEALTH CARE EDUCATION/TRAINING PROGRAM

## 2024-07-02 PROCEDURE — 82040 ASSAY OF SERUM ALBUMIN: CPT | Performed by: STUDENT IN AN ORGANIZED HEALTH CARE EDUCATION/TRAINING PROGRAM

## 2024-07-02 PROCEDURE — 210N000002 HC R&B HEART CARE

## 2024-07-02 RX ORDER — DIPHENHYDRAMINE HYDROCHLORIDE 50 MG/ML
50 INJECTION INTRAMUSCULAR; INTRAVENOUS ONCE
Status: COMPLETED | OUTPATIENT
Start: 2024-07-02 | End: 2024-07-02

## 2024-07-02 RX ORDER — EPINEPHRINE 0.1 MG/ML
1 INJECTION INTRAVENOUS ONCE
Status: COMPLETED | OUTPATIENT
Start: 2024-07-02 | End: 2024-07-02

## 2024-07-02 RX ORDER — NALOXONE HYDROCHLORIDE 0.4 MG/ML
0.4 INJECTION, SOLUTION INTRAMUSCULAR; INTRAVENOUS; SUBCUTANEOUS
Status: DISCONTINUED | OUTPATIENT
Start: 2024-07-02 | End: 2024-07-04 | Stop reason: HOSPADM

## 2024-07-02 RX ORDER — TRAMADOL HYDROCHLORIDE 50 MG/1
50 TABLET ORAL EVERY 6 HOURS PRN
Status: DISCONTINUED | OUTPATIENT
Start: 2024-07-02 | End: 2024-07-04 | Stop reason: HOSPADM

## 2024-07-02 RX ORDER — LORAZEPAM 2 MG/ML
0.5 INJECTION INTRAMUSCULAR ONCE
Status: COMPLETED | OUTPATIENT
Start: 2024-07-02 | End: 2024-07-02

## 2024-07-02 RX ORDER — DIPHENHYDRAMINE HYDROCHLORIDE 50 MG/ML
50 INJECTION INTRAMUSCULAR; INTRAVENOUS EVERY 6 HOURS PRN
Status: DISCONTINUED | OUTPATIENT
Start: 2024-07-02 | End: 2024-07-02

## 2024-07-02 RX ORDER — NALOXONE HYDROCHLORIDE 0.4 MG/ML
0.2 INJECTION, SOLUTION INTRAMUSCULAR; INTRAVENOUS; SUBCUTANEOUS
Status: DISCONTINUED | OUTPATIENT
Start: 2024-07-02 | End: 2024-07-04 | Stop reason: HOSPADM

## 2024-07-02 RX ORDER — LORAZEPAM 0.5 MG/1
0.5 TABLET ORAL EVERY 4 HOURS PRN
Status: DISCONTINUED | OUTPATIENT
Start: 2024-07-02 | End: 2024-07-04 | Stop reason: HOSPADM

## 2024-07-02 RX ORDER — HYDROMORPHONE HYDROCHLORIDE 1 MG/ML
0.5 INJECTION, SOLUTION INTRAMUSCULAR; INTRAVENOUS; SUBCUTANEOUS
Status: DISCONTINUED | OUTPATIENT
Start: 2024-07-02 | End: 2024-07-02

## 2024-07-02 RX ORDER — METHOCARBAMOL 500 MG/1
500 TABLET, FILM COATED ORAL 2 TIMES DAILY PRN
Status: DISCONTINUED | OUTPATIENT
Start: 2024-07-02 | End: 2024-07-04 | Stop reason: HOSPADM

## 2024-07-02 RX ORDER — DIPHENHYDRAMINE HCL 25 MG
25 CAPSULE ORAL EVERY 6 HOURS PRN
Status: DISCONTINUED | OUTPATIENT
Start: 2024-07-02 | End: 2024-07-02

## 2024-07-02 RX ORDER — METHYLPREDNISOLONE SODIUM SUCCINATE 125 MG/2ML
125 INJECTION, POWDER, LYOPHILIZED, FOR SOLUTION INTRAMUSCULAR; INTRAVENOUS ONCE
Status: COMPLETED | OUTPATIENT
Start: 2024-07-02 | End: 2024-07-02

## 2024-07-02 RX ADMIN — HYDROXYZINE HYDROCHLORIDE 50 MG: 25 TABLET ORAL at 11:48

## 2024-07-02 RX ADMIN — DIPHENHYDRAMINE HYDROCHLORIDE 50 MG: 50 INJECTION, SOLUTION INTRAMUSCULAR; INTRAVENOUS at 17:20

## 2024-07-02 RX ADMIN — ACETAMINOPHEN 650 MG: 325 TABLET ORAL at 08:07

## 2024-07-02 RX ADMIN — CARVEDILOL 12.5 MG: 6.25 TABLET, FILM COATED ORAL at 20:09

## 2024-07-02 RX ADMIN — EPINEPHRINE 1 MG: 0.1 INJECTION INTRACARDIAC; INTRAVENOUS at 17:18

## 2024-07-02 RX ADMIN — ONDANSETRON 4 MG: 4 TABLET, ORALLY DISINTEGRATING ORAL at 10:38

## 2024-07-02 RX ADMIN — FAMOTIDINE 20 MG: 10 INJECTION, SOLUTION INTRAVENOUS at 17:25

## 2024-07-02 RX ADMIN — INSULIN ASPART 2 UNITS: 100 INJECTION, SOLUTION INTRAVENOUS; SUBCUTANEOUS at 21:52

## 2024-07-02 RX ADMIN — INSULIN GLARGINE 10 UNITS: 100 INJECTION, SOLUTION SUBCUTANEOUS at 21:51

## 2024-07-02 RX ADMIN — PREDNISONE 40 MG: 20 TABLET ORAL at 08:07

## 2024-07-02 RX ADMIN — LEVOTHYROXINE SODIUM 300 MCG: 0.15 TABLET ORAL at 08:07

## 2024-07-02 RX ADMIN — DIPHENHYDRAMINE HYDROCHLORIDE 25 MG: 50 INJECTION INTRAMUSCULAR; INTRAVENOUS at 11:48

## 2024-07-02 RX ADMIN — LORAZEPAM 0.5 MG: 2 INJECTION INTRAMUSCULAR; INTRAVENOUS at 17:28

## 2024-07-02 RX ADMIN — MONTELUKAST 10 MG: 10 TABLET, FILM COATED ORAL at 06:38

## 2024-07-02 RX ADMIN — FAMOTIDINE 40 MG: 20 TABLET, FILM COATED ORAL at 08:07

## 2024-07-02 RX ADMIN — CARVEDILOL 12.5 MG: 6.25 TABLET, FILM COATED ORAL at 08:07

## 2024-07-02 RX ADMIN — TRAMADOL HYDROCHLORIDE 50 MG: 50 TABLET, COATED ORAL at 14:02

## 2024-07-02 RX ADMIN — METHYLPREDNISOLONE SODIUM SUCCINATE 125 MG: 125 INJECTION, POWDER, FOR SOLUTION INTRAMUSCULAR; INTRAVENOUS at 17:18

## 2024-07-02 RX ADMIN — TRAMADOL HYDROCHLORIDE 50 MG: 50 TABLET, COATED ORAL at 04:11

## 2024-07-02 RX ADMIN — CETIRIZINE HYDROCHLORIDE 20 MG: 10 TABLET, FILM COATED ORAL at 08:07

## 2024-07-02 RX ADMIN — METHOCARBAMOL 500 MG: 500 TABLET ORAL at 20:13

## 2024-07-02 RX ADMIN — ONDANSETRON 4 MG: 2 INJECTION INTRAMUSCULAR; INTRAVENOUS at 17:12

## 2024-07-02 RX ADMIN — CETIRIZINE HYDROCHLORIDE 20 MG: 10 TABLET, FILM COATED ORAL at 20:09

## 2024-07-02 ASSESSMENT — ACTIVITIES OF DAILY LIVING (ADL)
ADLS_ACUITY_SCORE: 37

## 2024-07-02 NOTE — SIGNIFICANT EVENT
Significant Event Note    Time of event: 5:30 PM July 2, 2024    Description of event:  Rapid response called for sudden onset of shortness of breath, cough, persistent vomiting. Patient was eating dinner when her symptoms started. On evaluation, patient reported feeling tongue swelling and difficulty breathing along with abdominal pain. She stated that this is very similar to the event that brought her to the hospital on 7/1. Vitals were stable.    Plan:  -Received epinephrine, methylprednisolone, famotidine, Benadryl.  Symptoms has significantly improved and patient is back to baseline within 5 to 10 minutes.  -Suspicion of recurrence angioedema versus other possible etiologies such as anxiety attack.  -Keep patient on telemetry  -Check VBG      Discussed with: bedside nurse    HOLLIS NEAL MD

## 2024-07-02 NOTE — PLAN OF CARE
Patient A&O x4 and pleasant. VSS on RA while awake, 2L while asleep. Tele reads SR/ST. Pain is managed with PO tylenol, PO tramadol and non pharm methods. PRN zofran given for nausea, was effective. IV benadryl and hydroxyzine given for increased symptoms and difficulty swallowing. Patient ambulates independently. PIV x2 are patent and saline locked. Blood sugar monitored and corrected with novolog. Tolerating minimal oral intake and voiding appropriately. Last BM on 6/30. Patient resting in bed. Patient is safe, call light within reach.    Mary Cannon, RN    Problem: Gas Exchange Impaired  Goal: Optimal Gas Exchange  Outcome: Progressing  Intervention: Optimize Oxygenation and Ventilation  Recent Flowsheet Documentation  Taken 7/2/2024 0800 by Mary Cannon, RN  Head of Bed (HOB) Positioning: HOB at 20 degrees     Problem: Anxiety  Goal: Anxiety Reduction or Resolution  Outcome: Progressing   Goal Outcome Evaluation:

## 2024-07-02 NOTE — H&P
[facility]    History and Physical - Hospitalist Service       Date of Admission:  7/1/2024    Assessment & Plan      Aniyah Delgado is a 44 year old female admitted on 7/1/2024.  She has a past medical history significant for hypertension, diabetes, angioedema, asthma who presented to the hospital with shortness of breath, cough, possible lips and tongue swelling.    Angioedema  History of recurrent angioedema requiring hospitalization and intubation  Asthma exacerbation  -No known heart history angioedema  -C1 and C4 complement already checked  -Tryptase checked in 2/2024 which was normal    Plan  -Check CRP  -Prednisone 40 mg daily  -Continue home Zyrtec 20 mg twice daily  -Continue home famotidine 40 mg twice daily  -Continue home hydroxyzine  -Continue home Benadryl as needed    Asthma  -ED reported wheezing on admission, wheezing has completely resolved.  -Saturating well on room air.    Plan  -Continue home inhalers or equivalent  -Continue home montelukast 10 mg daily    Hypothyroidism    Plan  -Continue home Synthroid 300 micrograms daily  -Check TSH    Hypertension  -At home on Coreg 12.5 mg twice daily, Dyazide 37/25 mg daily  -Blood pressures currently around 140/80    Plan  -Continue home on Coreg with holding parameters  -Hold home Dyazide given possible DUANE    Acute kidney injury  -Baseline creatinine around 0.9 creatinine on presentation 1.2    Plan  -Recheck kidney function on 7/2    Diabetes  -At home on Trulicity    Plan  -Hold home Trulicity  -Start sliding scale insulin    Anxiety  Depression  Fibromyalgia    Atrial fibrillation  -Recent history of atrial fibrillation, she was supposed to be on Xarelto, however, noncompliant.    Plan  -Continue home Xarelto 20 mg nightly              Diet:  Diabetes  DVT Prophylaxis: DOAC  Ortiz Catheter: Not present  Lines: None     Cardiac Monitoring: None  Code Status:  Full code    Clinically Significant Risk Factors Present on  Admission               # Drug Induced Coagulation Defect: home medication list includes an anticoagulant medication                    # Asthma: noted on problem list        Disposition Plan     Medically Ready for Discharge: Anticipated Tomorrow           HOLLIS NEAL MD  Hospitalist Service  [facility]  Securely message with [employer] (more info)  Text page via [facility] Paging/Directory     ______________________________________________________________________    Chief Complaint   Cough, shortness of breath, possible tongue swelling.    History is obtained from the patient    History of Present Illness   Aniyah Delgado is a 44 year old female admitted on 7/1/2024.  She has a past medical history significant for hypertension, diabetes, angioedema, asthma who presented to the hospital with shortness of breath, cough, possible lips and tongue swelling.    Per patient she was in her usual state of health until 7/1/2024.  She was driving to the airport to drop some of her relatives, she was very stressed as she went to the wrong terminal and she was having difficulty navigating given multiple reconstructions.  She also had a few peanut butter M&Ms.  She suddenly started to cough severely and eventually started to have shortness of breath and she felt that her tongue and lips were swollen.  She denies any fever, chills, nausea but had an episode of vomiting due to persistent cough.  She reported similar symptoms in the past, per patient she was evaluated multiple times at [facility] and she was told that she have type III angioedema.  She had a history of intubation due to angioedema.  She has asthma, cannot specify any triggers.  No clear triggers of her recurrent episodes of cough, shortness of breath and questionable tongue swelling.  It is not clearly related to food or specific allergens.  Not related to any medications.  Vitals on presentation largely unremarkable.    BMP showed CO2 of 30,  creatinine 1.2 baseline creatinine 0.9, VBG showed pH of 7.32, pCO2 63 with bicarb of 33 suggestive of acute on chronic respiratory acidosis. CBC is unremarkable.  Chest x-ray is largely unremarkable.  Patient received Benadryl, methylprednisone, Zofran.  She there were concerns about her respiratory status and ED considered intubation, however, her symptoms has completely resolved within a few hours.                Past Medical History    History reviewed. No pertinent past medical history.    Past Surgical History   History reviewed. No pertinent surgical history.    Prior to Admission Medications   Prior to Admission Medications   Prescriptions Last Dose Informant Patient Reported? Taking?   Dulaglutide (TRULICITY) 3 MG/0.5ML SOPN Will start 7/11  Yes Yes   Sig: Inject 3 mg Subcutaneous every 7 days   EPINEPHrine (ANY BX GENERIC EQUIV) 0.3 MG/0.3ML injection 2-pack 7/1/2024 at afternoon  Yes Yes   Sig: Inject 0.3 mg into the muscle as needed for anaphylaxis   HUMULIN N KWIKPEN 100 UNIT/ML susp More than a month at March  Yes Yes   Sig: Inject 10-20 Units Subcutaneous 2 times daily (before meals) Inject 10-20 Units under the skin 2 (two) times a day as needed (with meals when taking prednisone).   SYNTHROID 300 MCG tablet 7/1/2024 at AM; please dispense brand only  Yes Yes   Sig: Take 300 mcg by mouth daily   Vitamin D3 (VITAMIN D, CHOLECALCIFEROL,) 25 mcg (1000 units) tablet 7/1/2024 at AM  Yes Yes   Sig: Take 1 tablet by mouth daily   acetaminophen (TYLENOL) 650 MG CR tablet 7/1/2024 at AM  Yes Yes   Sig: Take 650 mg by mouth every morning   albuterol (PROAIR HFA/PROVENTIL HFA/VENTOLIN HFA) 108 (90 Base) MCG/ACT inhaler 6/30/2024 at afternoon; 2 puff  Yes Yes   Sig: Inhale 1-2 puffs into the lungs every 4 hours as needed   carvedilol (COREG) 12.5 MG tablet 7/1/2024 at AM; 1 of 2  Yes Yes   Sig: Take 12.5 mg by mouth 2 times daily (with meals)   cetirizine (ZYRTEC) 10 MG tablet 7/1/2024 at AM; 1 of 2  Yes Yes    Sig: Take 20 mg by mouth 2 times daily   diphenhydrAMINE (BENADRYL) 25 MG tablet 7/1/2024 at AM; 2 tab  Yes Yes   Sig: Take 25-50 mg by mouth every 6 hours as needed for itching or allergies   dulaglutide (TRULICITY) 1.5 MG/0.5ML pen Past Week at 6/27  Yes Yes   Sig: Inject 1.5 mg Subcutaneous every 7 days   famotidine (PEPCID) 40 MG tablet 7/1/2024 at AM; 1 of 2  Yes Yes   Sig: Take 40 mg by mouth 2 times daily   hydrOXYzine HCl (ATARAX) 25 MG tablet More than a month at PRN  Yes Yes   Sig: Take 25-50 mg by mouth every 6 hours as needed for itching   ibuprofen (ADVIL/MOTRIN) 800 MG tablet 6/30/2024 at afternoon  Yes Yes   Sig: Take 800 mg by mouth every 8 hours as needed for moderate pain   ipratropium - albuterol 0.5 mg/2.5 mg/3 mL (DUONEB) 0.5-2.5 (3) MG/3ML neb solution Past Month at PRN  Yes Yes   Sig: Take 1 vial by nebulization every 6 hours as needed for wheezing or shortness of breath   magnesium oxide 400 MG tablet 7/1/2024 at AM  Yes Yes   Sig: Take 400 mg by mouth daily   montelukast (SINGULAIR) 10 MG tablet 7/1/2024 at AM  Yes Yes   Sig: Take 10 mg by mouth daily before breakfast   ondansetron (ZOFRAN ODT) 4 MG ODT tab 7/1/2024 at afternoon  Yes Yes   Sig: Take 4 mg by mouth every 8 hours as needed for nausea or vomiting   rivaroxaban ANTICOAGULANT (XARELTO) 20 MG TABS tablet Past Month at 2 weeks ago  Yes Yes   Sig: Take 20 mg by mouth daily (with dinner)   triamcinolone (KENALOG) 0.1 % external cream Past Week at few days ago  Yes Yes   Sig: Apply 1 Application topically 2 times daily as needed (eczema)   triamterene-HCTZ (DYAZIDE) 37.5-25 MG capsule 7/1/2024 at AM  Yes Yes   Sig: Take 1 capsule by mouth daily      Facility-Administered Medications: None          Physical Exam   Vital Signs: Temp: 97.1  F (36.2  C) Temp src: Temporal BP: 139/88 Pulse: 84   Resp: 18 SpO2: 93 % O2 Device: None (Room air) Oxygen Delivery: 4 LPM  Weight: 320 lbs 0 oz  Physical Exam  Constitutional:       General: She is  not in acute distress.     Appearance: She is obese. She is not ill-appearing or toxic-appearing.   HENT:      Mouth/Throat:      Pharynx: Oropharynx is clear. No oropharyngeal exudate.      Comments: No clear tongue edema.  Cardiovascular:      Rate and Rhythm: Normal rate.   Pulmonary:      Effort: Pulmonary effort is normal. No respiratory distress.      Breath sounds: No stridor. No wheezing or rhonchi.   Skin:     General: Skin is warm and dry.   Neurological:      Mental Status: She is alert.   Psychiatric:         Mood and Affect: Mood normal.          Medical Decision Making       80 MINUTES SPENT BY ME on the date of service doing chart review, history, exam, documentation & further activities per the note.      Data     I have personally reviewed the following data over the past 24 hrs:    6.2  \   15.3   / 281     140 101 11.5 /  225 (H)   3.9 30 (H) 1.20 (H) \     ALT: 19 AST: 28 AP: 87 TBILI: 0.5   ALB: 4.1 TOT PROTEIN: 7.1 LIPASE: N/A     Trop: 9 BNP: <36       Imaging results reviewed over the past 24 hrs:   Recent Results (from the past 24 hour(s))   XR Chest Port 1 View    Narrative    EXAM: XR CHEST PORT 1 VIEW  LOCATION: [facility]  DATE: 7/1/2024    INDICATION: Respiratory distress  COMPARISON: None.      Impression    IMPRESSION: Negative chest.

## 2024-07-02 NOTE — PLAN OF CARE
Problem: Comorbidity Management  Goal: Blood Glucose Levels Within Targeted Range  Outcome: Progressing  Intervention: Monitor and Manage Glycemia  Recent Flowsheet Documentation  Taken 7/1/2024 2349 by Michelle Castro RN  Medication Review/Management: medications reviewed  Goal: Blood Pressure in Desired Range  Outcome: Progressing  Intervention: Maintain Blood Pressure Management  Recent Flowsheet Documentation  Taken 7/1/2024 2349 by Michelle Castro RN  Medication Review/Management: medications reviewed     Problem: Gas Exchange Impaired  Goal: Optimal Gas Exchange  Outcome: Progressing  Intervention: Optimize Oxygenation and Ventilation  Recent Flowsheet Documentation  Taken 7/1/2024 2349 by Michelle Castro RN  Head of Bed (HOB) Positioning: HOB at 20 degrees   Goal Outcome Evaluation:  Patient A&O x4, able to make needs known. VSS on 3L O2 via NC. Pain rated 10/10 in back. Pain improved with heat application and PRN Tramadol. Right and left PIV SL. Denied SOB and chest pain. Frequent, productive cough. Lung sounds clear. Has generalized weakness. Voiding spontaneously. No BM during shift. ACHS sugar checks. Cardiac diet. Independent with activity. Discharge pending.

## 2024-07-02 NOTE — PROGRESS NOTES
RAPID RESPONSE DOCUMENTATION    Rapid response called overhead at approx. 1708, house officer presented and was dismissed by hospitalist Dr Hero Red.     David Garrido, DO PGY3

## 2024-07-02 NOTE — PROGRESS NOTES
[facility]    Medicine Progress Note - Hospitalist Service    Date of Admission:  7/1/2024    Assessment & Plan   Aniyah Delgado is a 44 year old female admitted on 7/1/2024.  She has a past medical history significant for hypertension, diabetes, angioedema, asthma who presented to the hospital with shortness of breath, cough, possible lips and tongue swelling.  She was diagnosed with acute angioedema attack versus asthma exacerbation.  In the ED received methylprednisone, racepinephrine with resolution of her symptoms within a few hours.  7/2, rapid response called again around 5:30 PM for recurrences of shortness of breath, cough, vomiting, sensation of tongue and lip swelling.  Received epinephrine, methylprednisone, famotidine and Benadryl.  Symptoms resolved within 5 minutes.     Angioedema  History of recurrent angioedema requiring hospitalization and intubation  Asthma exacerbation  -C1 and C4 complement already checked  -Tryptase checked in 2/2024 which was normal  -Possible angioedema on 7/1 and possible recurrent angioedema on 7/2.  -CRP is mildly elevated.  -Patient was admitted for IMCU for closer monitoring.    Plan  -Consult pulmonology  -Continue home Zyrtec 20 mg twice daily  -Continue home famotidine 40 mg twice daily  -Continue home hydroxyzine  -Continue home Benadryl as needed  -Continue to monitor symptoms very closely patient is high risk for recurrent angioedema.   -Educated about the importance of avoiding any possible triggers such as food  -Give one-time Ativan 0.5 mg IV (patient appears anxious)    Asthma  -ED reported wheezing on admission, wheezing has completely resolved within a few hours.  -Saturating well on room air.    Plan  -Continue home inhalers or equivalent  -Continue home montelukast 10 mg daily    Hypothyroidism  -TSH elevated around 44 with borderline T4 level.    Plan  -Continue Synthroid 300 mcg daily  -Will curbside endocrinology on  "7/3.    Hypertension  -At home on Coreg 12.5 mg twice daily, Dyazide 37/25 mg daily  -Blood pressures currently around 140/80    Plan  -Continue home on Coreg with holding parameters  -Hold home Dyazide given possible DUANE    Acute kidney injury  -Baseline creatinine around 0.9 creatinine on presentation 1.2  -Creatinine is back to baseline.    Plan   -No further intervention      Diabetes  -At home on Trulicity    Plan  -Hold home Trulicity  -Start sliding scale insulin    Anxiety  Depression  Fibromyalgia  -Per patient she was on Lyrica and an SSRI for fibromyalgia, however, it was discontinued as it was causing side effect of weight gain without any significant improvement of symptoms.  -Reported increased anxiety and uncontrolled symptoms of fibromyalgia over the last few weeks.  -Unclear if her anxiety is contributing to her recurrent symptoms of possible angioedema.?  Anxiety attack.    Plan  -Consult psychiatry  -Ativan 0.5 mg as needed for anxiety.  -Robaxin    Atrial fibrillation  -Recent history of atrial fibrillation, she was supposed to be on Xarelto, however, noncompliant.  -Patient stated that her A-fib is not 100% confirmed that she is supposed to be on a cardiac patch at home.    Plan  -Continue home Xarelto 20 mg nightly              Diet: Combination Diet No Caffeine Diet, Low Saturated Fat Na <2400mg Diet    DVT Prophylaxis: DOAC  Ortiz Catheter: Not present  Lines: None     Cardiac Monitoring: None  Code Status: Full Code      Clinically Significant Risk Factors Present on Admission               # Drug Induced Coagulation Defect: home medication list includes an anticoagulant medication               # DMII: A1C = 6.8 % (Ref range: <5.7 %) within past 6 months    # Severe Obesity: Estimated body mass index is 58.44 kg/m  as calculated from the following:    Height as of this encounter: 1.6 m (5' 3\").    Weight as of this encounter: 149.6 kg (329 lb 14.4 oz).       # Asthma: noted on problem list "        Disposition Plan     Medically Ready for Discharge: Anticipated in 2-4 Days             HOLLIS NEAL MD  Hospitalist Service  [facility]  Securely message with [employer] (more info)  Text page via [employer] Paging/Directory   ______________________________________________________________________    Interval History   Another episode of sudden onset persistent cough, vomiting, sensation of tongue swelling and shortness of breath around 5:30 PM.    Physical Exam   Vital Signs: Temp: 98.9  F (37.2  C) Temp src: Oral BP: 132/77 Pulse: 98   Resp: 18 SpO2: 90 % O2 Device: None (Room air) Oxygen Delivery: 2 LPM  Weight: 329 lbs 14.4 oz  Exam this morning    Physical Exam  Constitutional:       General: She is not in acute distress.     Appearance: She is obese. She is not toxic-appearing.   HENT:      Head:      Comments: No tongue or lip swelling.     Mouth/Throat:      Mouth: Mucous membranes are moist.   Cardiovascular:      Rate and Rhythm: Normal rate.   Pulmonary:      Effort: Pulmonary effort is normal. No respiratory distress.      Breath sounds: No wheezing.   Skin:     General: Skin is warm and dry.   Neurological:      Mental Status: She is alert.   Psychiatric:         Mood and Affect: Mood normal.          Medical Decision Making       60 MINUTES SPENT BY ME on the date of service doing chart review, history, exam, documentation & further activities per the note.      Data     I have personally reviewed the following data over the past 24 hrs:    7.0  \   14.9   / 255     137 100 12.4 /  245 (H)   3.9 25 0.91 \     ALT: 13 AST: 22 AP: 80 TBILI: 0.4   ALB: 3.9 TOT PROTEIN: 6.8 LIPASE: N/A     TSH: N/A T4: N/A A1C: N/A     Procal: N/A CRP: N/A Lactic Acid: N/A         Imaging results reviewed over the past 24 hrs:   No results found for this or any previous visit (from the past 24 hour(s)).

## 2024-07-02 NOTE — ED NOTES
Pt up to the bathroom independently. Sats maintaining on RA. Snacks and drinks provided. Blood glucose checked prior to meal- 225. Changed into a gown. Linens changed as well.

## 2024-07-02 NOTE — SIGNIFICANT EVENT
RRT called by bedside RNCharline  Bedside assessment respiratory distress, audible stridor, emesis.    Anaphylaxis kit pulled. Per Dr. Red, meds administered:  -1mg IV Epinephrine  -50mg IV Benadryl  -125mg Solu-Medrol  -40mg IV famotidine  -0.5mg IV Ativan    Significant other at bedside, updated by attending.

## 2024-07-02 NOTE — MEDICATION SCRIBE - ADMISSION MEDICATION HISTORY
Medication Scribe Admission Medication History    Admission medication history is complete. The information provided in this note is only as accurate as the sources available at the time of the update.    Information Source(s): Patient and CareEverywhere/SureScripts via in-person    Pertinent Information: Patient reports having an MTM visit 6/25 and had several changes. Took AM medications today. Reports that she typically has magnesium labs on the low end. Is taking H2 antagonist, loop diuretic. Has insulin for hyperglycemia related to steroid use and is not normally insulin dependent. Please dispense Synthroid brand only as levothyroxine does not help according to the pt.     Used last epi-pen this afternoon and will need a refill.     Should be taking Xarelto, but has not in the last 2 weeks.     Changes made to PTA medication list:  Added:   Added all medications, none listed PTA.   Deleted: None  Changed: None    Allergies reviewed with patient and updates made in EHR: yes    Medication History Completed By: Ubaldo Dior 7/1/2024 8:37 PM    PTA Med List   Medication Sig Note Last Dose    acetaminophen (TYLENOL) 650 MG CR tablet Take 650 mg by mouth every morning  7/1/2024 at AM    albuterol (PROAIR HFA/PROVENTIL HFA/VENTOLIN HFA) 108 (90 Base) MCG/ACT inhaler Inhale 1-2 puffs into the lungs every 4 hours as needed  6/30/2024 at afternoon; 2 puff    carvedilol (COREG) 12.5 MG tablet Take 12.5 mg by mouth 2 times daily (with meals)  7/1/2024 at AM; 1 of 2    cetirizine (ZYRTEC) 10 MG tablet Take 20 mg by mouth 2 times daily  7/1/2024 at AM; 1 of 2    diphenhydrAMINE (BENADRYL) 25 MG tablet Take 25-50 mg by mouth every 6 hours as needed for itching or allergies  7/1/2024 at AM; 2 tab    dulaglutide (TRULICITY) 1.5 MG/0.5ML pen Inject 1.5 mg Subcutaneous every 7 days  Past Week at 6/27    [START ON 7/11/2024] Dulaglutide (TRULICITY) 3 MG/0.5ML SOPN Inject 3 mg Subcutaneous every 7 days 7/1/2024: Will start  7/11 Will start 7/11    EPINEPHrine (ANY BX GENERIC EQUIV) 0.3 MG/0.3ML injection 2-pack Inject 0.3 mg into the muscle as needed for anaphylaxis  7/1/2024 at afternoon    famotidine (PEPCID) 40 MG tablet Take 40 mg by mouth 2 times daily  7/1/2024 at AM; 1 of 2    HUMULIN N KWIKPEN 100 UNIT/ML susp Inject 10-20 Units Subcutaneous 2 times daily (before meals) Inject 10-20 Units under the skin 2 (two) times a day as needed (with meals when taking prednisone). 7/1/2024: Only when taking steroids More than a month at March    hydrOXYzine HCl (ATARAX) 25 MG tablet Take 25-50 mg by mouth every 6 hours as needed for itching  More than a month at PRN    ibuprofen (ADVIL/MOTRIN) 800 MG tablet Take 800 mg by mouth every 8 hours as needed for moderate pain  6/30/2024 at afternoon    ipratropium - albuterol 0.5 mg/2.5 mg/3 mL (DUONEB) 0.5-2.5 (3) MG/3ML neb solution Take 1 vial by nebulization every 6 hours as needed for wheezing or shortness of breath  Past Month at PRN    magnesium oxide 400 MG tablet Take 400 mg by mouth daily  7/1/2024 at AM    montelukast (SINGULAIR) 10 MG tablet Take 10 mg by mouth daily before breakfast  7/1/2024 at AM    ondansetron (ZOFRAN ODT) 4 MG ODT tab Take 4 mg by mouth every 8 hours as needed for nausea or vomiting  7/1/2024 at afternoon    rivaroxaban ANTICOAGULANT (XARELTO) 20 MG TABS tablet Take 20 mg by mouth daily (with dinner)  Past Month at 2 weeks ago    SYNTHROID 300 MCG tablet Take 300 mcg by mouth daily  7/1/2024 at AM; please dispense brand only    triamcinolone (KENALOG) 0.1 % external cream Apply 1 Application topically 2 times daily as needed (eczema)  Past Week at few days ago    triamterene-HCTZ (DYAZIDE) 37.5-25 MG capsule Take 1 capsule by mouth daily  7/1/2024 at AM    Vitamin D3 (VITAMIN D, CHOLECALCIFEROL,) 25 mcg (1000 units) tablet Take 1 tablet by mouth daily  7/1/2024 at AM

## 2024-07-03 LAB
GLUCOSE BLDC GLUCOMTR-MCNC: 241 MG/DL (ref 70–99)
GLUCOSE BLDC GLUCOMTR-MCNC: 279 MG/DL (ref 70–99)
GLUCOSE BLDC GLUCOMTR-MCNC: 291 MG/DL (ref 70–99)
GLUCOSE BLDC GLUCOMTR-MCNC: 295 MG/DL (ref 70–99)
GLUCOSE BLDC GLUCOMTR-MCNC: 324 MG/DL (ref 70–99)

## 2024-07-03 PROCEDURE — 250N000013 HC RX MED GY IP 250 OP 250 PS 637: Performed by: REGISTERED NURSE

## 2024-07-03 PROCEDURE — 99233 SBSQ HOSP IP/OBS HIGH 50: CPT | Performed by: STUDENT IN AN ORGANIZED HEALTH CARE EDUCATION/TRAINING PROGRAM

## 2024-07-03 PROCEDURE — 120N000004 HC R&B MS OVERFLOW

## 2024-07-03 PROCEDURE — 250N000013 HC RX MED GY IP 250 OP 250 PS 637: Performed by: HOSPITALIST

## 2024-07-03 PROCEDURE — 250N000013 HC RX MED GY IP 250 OP 250 PS 637: Performed by: INTERNAL MEDICINE

## 2024-07-03 PROCEDURE — 94640 AIRWAY INHALATION TREATMENT: CPT

## 2024-07-03 PROCEDURE — 99254 IP/OBS CNSLTJ NEW/EST MOD 60: CPT | Performed by: REGISTERED NURSE

## 2024-07-03 PROCEDURE — 250N000012 HC RX MED GY IP 250 OP 636 PS 637: Performed by: STUDENT IN AN ORGANIZED HEALTH CARE EDUCATION/TRAINING PROGRAM

## 2024-07-03 PROCEDURE — 999N000157 HC STATISTIC RCP TIME EA 10 MIN

## 2024-07-03 PROCEDURE — 250N000013 HC RX MED GY IP 250 OP 250 PS 637: Performed by: STUDENT IN AN ORGANIZED HEALTH CARE EDUCATION/TRAINING PROGRAM

## 2024-07-03 PROCEDURE — 250N000009 HC RX 250: Performed by: STUDENT IN AN ORGANIZED HEALTH CARE EDUCATION/TRAINING PROGRAM

## 2024-07-03 PROCEDURE — 99254 IP/OBS CNSLTJ NEW/EST MOD 60: CPT | Mod: GC | Performed by: INTERNAL MEDICINE

## 2024-07-03 RX ORDER — ARIPIPRAZOLE 2 MG/1
2 TABLET ORAL DAILY
Status: DISCONTINUED | OUTPATIENT
Start: 2024-07-03 | End: 2024-07-04 | Stop reason: HOSPADM

## 2024-07-03 RX ORDER — TRIAMTERENE AND HYDROCHLOROTHIAZIDE 37.5; 25 MG/1; MG/1
1 CAPSULE ORAL DAILY
Status: DISCONTINUED | OUTPATIENT
Start: 2024-07-03 | End: 2024-07-04 | Stop reason: HOSPADM

## 2024-07-03 RX ORDER — ACETAMINOPHEN 325 MG/1
650 TABLET ORAL EVERY 4 HOURS PRN
Status: DISCONTINUED | OUTPATIENT
Start: 2024-07-03 | End: 2024-07-04 | Stop reason: HOSPADM

## 2024-07-03 RX ORDER — FAMOTIDINE 20 MG/1
40 TABLET, FILM COATED ORAL 2 TIMES DAILY
Status: DISCONTINUED | OUTPATIENT
Start: 2024-07-03 | End: 2024-07-04 | Stop reason: HOSPADM

## 2024-07-03 RX ADMIN — TRIAMTERENE AND HYDROCHLOROTHIAZIDE 1 CAPSULE: 37.5; 25 CAPSULE ORAL at 16:02

## 2024-07-03 RX ADMIN — INSULIN ASPART 2 UNITS: 100 INJECTION, SOLUTION INTRAVENOUS; SUBCUTANEOUS at 22:33

## 2024-07-03 RX ADMIN — LEVOTHYROXINE SODIUM 300 MCG: 0.15 TABLET ORAL at 08:27

## 2024-07-03 RX ADMIN — METHOCARBAMOL 500 MG: 500 TABLET ORAL at 17:32

## 2024-07-03 RX ADMIN — IPRATROPIUM BROMIDE AND ALBUTEROL SULFATE 3 ML: .5; 3 SOLUTION RESPIRATORY (INHALATION) at 09:40

## 2024-07-03 RX ADMIN — ACETAMINOPHEN 650 MG: 325 TABLET ORAL at 17:41

## 2024-07-03 RX ADMIN — TRAMADOL HYDROCHLORIDE 50 MG: 50 TABLET, COATED ORAL at 10:59

## 2024-07-03 RX ADMIN — ACETAMINOPHEN 650 MG: 325 TABLET ORAL at 08:26

## 2024-07-03 RX ADMIN — ARIPIPRAZOLE 2 MG: 2 TABLET ORAL at 16:03

## 2024-07-03 RX ADMIN — CARVEDILOL 12.5 MG: 6.25 TABLET, FILM COATED ORAL at 08:27

## 2024-07-03 RX ADMIN — CARVEDILOL 12.5 MG: 6.25 TABLET, FILM COATED ORAL at 17:27

## 2024-07-03 RX ADMIN — LORAZEPAM 0.5 MG: 0.5 TABLET ORAL at 23:38

## 2024-07-03 RX ADMIN — RIVAROXABAN 20 MG: 10 TABLET, FILM COATED ORAL at 16:03

## 2024-07-03 RX ADMIN — CETIRIZINE HYDROCHLORIDE 20 MG: 10 TABLET, FILM COATED ORAL at 20:06

## 2024-07-03 RX ADMIN — LORAZEPAM 0.5 MG: 0.5 TABLET ORAL at 17:32

## 2024-07-03 RX ADMIN — PREDNISONE 40 MG: 20 TABLET ORAL at 08:25

## 2024-07-03 RX ADMIN — FAMOTIDINE 40 MG: 20 TABLET, FILM COATED ORAL at 20:06

## 2024-07-03 RX ADMIN — CETIRIZINE HYDROCHLORIDE 20 MG: 10 TABLET, FILM COATED ORAL at 08:26

## 2024-07-03 RX ADMIN — MONTELUKAST 10 MG: 10 TABLET, FILM COATED ORAL at 08:25

## 2024-07-03 RX ADMIN — FAMOTIDINE 40 MG: 20 TABLET, FILM COATED ORAL at 12:32

## 2024-07-03 ASSESSMENT — ACTIVITIES OF DAILY LIVING (ADL)
ADLS_ACUITY_SCORE: 37

## 2024-07-03 NOTE — CONSULTS
"      Initial Psychiatric Consult   Consult date: July 3, 2024         Reason for Consult, requesting source:    Significant anxiety, recurrent angioedema tach versus anxiety attack    Requesting source: Hero Red    Labs and imaging reviewed. Provider contacted with recommendations.         HPI:   Psychiatry seeing patient today regarding anxiety.      Aniyah Delgado is a 44 year old female admitted on 7/1/2024.  She has a past medical history significant for hypertension, diabetes, angioedema, asthma who presented to the hospital with shortness of breath, cough, possible lips and tongue swelling.  She was diagnosed with acute angioedema attack versus asthma exacerbation.  In the ED received methylprednisone, racepinephrine with resolution of her symptoms within a few hours.  7/2, rapid response called again around 5:30 PM for recurrences of shortness of breath, cough, vomiting, sensation of tongue and lip swelling.  Received epinephrine, methylprednisone, famotidine and Benadryl.  Symptoms resolved within 5 minutes.  Pulmonology consulted.  Possible discharge on 7/4/2024 if symptoms are well-controlled.     Anxiety  Depression  Fibromyalgia  -Per patient she was on Lyrica and an SSRI for fibromyalgia, however, it was discontinued as it was causing side effect of weight gain without any significant improvement of symptoms.  -Reported increased anxiety and uncontrolled symptoms of fibromyalgia over the last few weeks.  -Unclear if her anxiety is contributing to her recurrent symptoms of possible angioedema.?  Anxiety attack.  -7/3, patient appears less anxious.  She stated that she is feeling well.       Today, patient reports \"I have racing thoughts all the time and can't shut my brain down.\" She shares that she has been prescribed Sertraline, Paxil and Lamictal in the past. Sertraline and Paxil were not effective in targeting her symptoms and she reports, \"Feeling like I was going crazy\" with these medications. " "Lamictal was helpful at first, but when dosing was increased to 200 mg daily, she noticed memory changes and difficulty \"differentiating reality. I forgot my mom was still living, but she  when I was young.\" Patient reports current symptoms of irritability, anger, impulsivity, racing thoughts, restlessness. She denies SI/SIB or other safety concerns at this time.         Past Psychiatric History:   Reports previous psychiatry for medication management and therapy. However, no recent treatment.         Substance Use and History:     Tobacco Use    Smoking status: Not on file    Smokeless tobacco: Not on file   Substance Use Topics    Alcohol use: Not on file           Past Medical History:   PAST MEDICAL HISTORY: History reviewed. No pertinent past medical history.    PAST SURGICAL HISTORY: History reviewed. No pertinent surgical history.          Family History:   FAMILY HISTORY: History reviewed. No pertinent family history.        Social History:   Lives at home with her 5 year-old and 7 year-old.          Physical ROS:   The 10 point Review of Systems is negative other than noted in the HPI or here.           Medications:     Current Facility-Administered Medications   Medication Dose Route Frequency Provider Last Rate Last Admin    acetaminophen (TYLENOL) tablet 650 mg  650 mg Oral QAM Hero Red MD   650 mg at 24 0826    carvedilol (COREG) tablet 12.5 mg  12.5 mg Oral BID w/meals Hero Red MD   12.5 mg at 24 0827    cetirizine (zyrTEC) tablet 20 mg  20 mg Oral BID Hero Red MD   20 mg at 24 0826    insulin aspart (NovoLOG) injection (RAPID ACTING)  1-7 Units Subcutaneous TID AC Hero Red MD   4 Units at 24 0822    insulin aspart (NovoLOG) injection (RAPID ACTING)  1-5 Units Subcutaneous At Bedtime Hero Red MD   2 Units at 24 215    insulin glargine (LANTUS PEN) injection 10 Units  10 Units Subcutaneous At Bedtime Hero Red MD   10 Units at " 07/02/24 2151    levothyroxine (SYNTHROID/LEVOTHROID) tablet 300 mcg  300 mcg Oral Daily Hero Red MD   300 mcg at 07/03/24 0827    montelukast (SINGULAIR) tablet 10 mg  10 mg Oral QAM AC Hero Red MD   10 mg at 07/03/24 0825    predniSONE (DELTASONE) tablet 40 mg  40 mg Oral Daily Hero Red MD   40 mg at 07/03/24 0825    rivaroxaban ANTICOAGULANT (XARELTO) tablet 20 mg  20 mg Oral Daily with supper Hero Red MD   20 mg at 07/01/24 2124              Allergies:     Allergies   Allergen Reactions    Augmentin [Amoxicillin-Pot Clavulanate]     Lisinopril     Alprazolam Other (See Comments)     Excess sedation    Diltiazem Dizziness          Labs:     Recent Results (from the past 48 hour(s))   Blood gas venous    Collection Time: 07/01/24  3:38 PM   Result Value Ref Range    pH Venous 7.32 7.32 - 7.43    pCO2 Venous 63 (H) 40 - 50 mm Hg    pO2 Venous 26 25 - 47 mm Hg    Bicarbonate Venous 33 (H) 21 - 28 mmol/L    Base Excess/Deficit Venous 6.7 (H) -3.0 - 3.0 mmol/L    FIO2 0     Oxyhemoglobin Venous 43 (L) 70 - 75 %    O2 Sat, Venous 44.1 (L) 70.0 - 75.0 %   HCG QUALitative pregnancy (blood)    Collection Time: 07/01/24  3:38 PM   Result Value Ref Range    hCG Serum Qualitative Negative Negative   Comprehensive metabolic panel    Collection Time: 07/01/24  3:38 PM   Result Value Ref Range    Sodium 140 135 - 145 mmol/L    Potassium 3.9 3.4 - 5.3 mmol/L    Carbon Dioxide (CO2) 30 (H) 22 - 29 mmol/L    Anion Gap 9 7 - 15 mmol/L    Urea Nitrogen 11.5 6.0 - 20.0 mg/dL    Creatinine 1.20 (H) 0.51 - 0.95 mg/dL    GFR Estimate 57 (L) >60 mL/min/1.73m2    Calcium 9.4 8.6 - 10.0 mg/dL    Chloride 101 98 - 107 mmol/L    Glucose 130 (H) 70 - 99 mg/dL    Alkaline Phosphatase 87 40 - 150 U/L    AST 28 0 - 45 U/L    ALT 19 0 - 50 U/L    Protein Total 7.1 6.4 - 8.3 g/dL    Albumin 4.1 3.5 - 5.2 g/dL    Bilirubin Total 0.5 <=1.2 mg/dL   Nt probnp inpatient    Collection Time: 07/01/24  3:38 PM   Result Value  Ref Range    N terminal Pro BNP Inpatient <36 0 - 450 pg/mL   Troponin T, High Sensitivity (now)    Collection Time: 07/01/24  3:38 PM   Result Value Ref Range    Troponin T, High Sensitivity 9 <=14 ng/L   CBC with platelets and differential    Collection Time: 07/01/24  3:38 PM   Result Value Ref Range    WBC Count 6.2 4.0 - 11.0 10e3/uL    RBC Count 5.02 3.80 - 5.20 10e6/uL    Hemoglobin 15.3 11.7 - 15.7 g/dL    Hematocrit 45.8 35.0 - 47.0 %    MCV 91 78 - 100 fL    MCH 30.5 26.5 - 33.0 pg    MCHC 33.4 31.5 - 36.5 g/dL    RDW 12.9 10.0 - 15.0 %    Platelet Count 281 150 - 450 10e3/uL    % Neutrophils 53 %    % Lymphocytes 31 %    % Monocytes 6 %    % Eosinophils 9 %    % Basophils 1 %    % Immature Granulocytes 0 %    NRBCs per 100 WBC 0 <1 /100    Absolute Neutrophils 3.3 1.6 - 8.3 10e3/uL    Absolute Lymphocytes 1.9 0.8 - 5.3 10e3/uL    Absolute Monocytes 0.4 0.0 - 1.3 10e3/uL    Absolute Eosinophils 0.6 0.0 - 0.7 10e3/uL    Absolute Basophils 0.0 0.0 - 0.2 10e3/uL    Absolute Immature Granulocytes 0.0 <=0.4 10e3/uL    Absolute NRBCs 0.0 10e3/uL   CRP inflammation    Collection Time: 07/01/24  3:38 PM   Result Value Ref Range    CRP Inflammation 9.29 (H) <5.00 mg/L   TSH with free T4 reflex    Collection Time: 07/01/24  3:38 PM   Result Value Ref Range    TSH 44.80 (H) 0.30 - 4.20 uIU/mL   Hemoglobin A1c    Collection Time: 07/01/24  3:38 PM   Result Value Ref Range    Hemoglobin A1C 6.8 (H) <5.7 %   T4 free    Collection Time: 07/01/24  3:38 PM   Result Value Ref Range    Free T4 0.91 0.90 - 1.70 ng/dL   Symptomatic Influenza A/B, RSV, & SARS-CoV2 PCR (COVID-19) Nasopharyngeal    Collection Time: 07/01/24  3:44 PM    Specimen: Nasopharyngeal; Swab   Result Value Ref Range    Influenza A PCR Negative Negative    Influenza B PCR Negative Negative    RSV PCR Negative Negative    SARS CoV2 PCR Negative Negative   Glucose by meter    Collection Time: 07/01/24  8:53 PM   Result Value Ref Range    GLUCOSE BY METER  POCT 225 (H) 70 - 99 mg/dL   Glucose by meter    Collection Time: 07/02/24  1:57 AM   Result Value Ref Range    GLUCOSE BY METER POCT 213 (H) 70 - 99 mg/dL   Comprehensive metabolic panel    Collection Time: 07/02/24  6:34 AM   Result Value Ref Range    Sodium 137 135 - 145 mmol/L    Potassium 3.9 3.4 - 5.3 mmol/L    Carbon Dioxide (CO2) 25 22 - 29 mmol/L    Anion Gap 12 7 - 15 mmol/L    Urea Nitrogen 12.4 6.0 - 20.0 mg/dL    Creatinine 0.91 0.51 - 0.95 mg/dL    GFR Estimate 80 >60 mL/min/1.73m2    Calcium 9.2 8.6 - 10.0 mg/dL    Chloride 100 98 - 107 mmol/L    Glucose 239 (H) 70 - 99 mg/dL    Alkaline Phosphatase 80 40 - 150 U/L    AST 22 0 - 45 U/L    ALT 13 0 - 50 U/L    Protein Total 6.8 6.4 - 8.3 g/dL    Albumin 3.9 3.5 - 5.2 g/dL    Bilirubin Total 0.4 <=1.2 mg/dL   CBC with platelets and differential    Collection Time: 07/02/24  6:34 AM   Result Value Ref Range    WBC Count 7.0 4.0 - 11.0 10e3/uL    RBC Count 4.80 3.80 - 5.20 10e6/uL    Hemoglobin 14.9 11.7 - 15.7 g/dL    Hematocrit 43.5 35.0 - 47.0 %    MCV 91 78 - 100 fL    MCH 31.0 26.5 - 33.0 pg    MCHC 34.3 31.5 - 36.5 g/dL    RDW 12.6 10.0 - 15.0 %    Platelet Count 255 150 - 450 10e3/uL    % Neutrophils 92 %    % Lymphocytes 7 %    % Monocytes 0 %    % Eosinophils 0 %    % Basophils 0 %    % Immature Granulocytes 1 %    NRBCs per 100 WBC 0 <1 /100    Absolute Neutrophils 6.4 1.6 - 8.3 10e3/uL    Absolute Lymphocytes 0.5 (L) 0.8 - 5.3 10e3/uL    Absolute Monocytes 0.0 0.0 - 1.3 10e3/uL    Absolute Eosinophils 0.0 0.0 - 0.7 10e3/uL    Absolute Basophils 0.0 0.0 - 0.2 10e3/uL    Absolute Immature Granulocytes 0.0 <=0.4 10e3/uL    Absolute NRBCs 0.0 10e3/uL   Glucose by meter    Collection Time: 07/02/24  7:44 AM   Result Value Ref Range    GLUCOSE BY METER POCT 234 (H) 70 - 99 mg/dL   Glucose by meter    Collection Time: 07/02/24  1:02 PM   Result Value Ref Range    GLUCOSE BY METER POCT 241 (H) 70 - 99 mg/dL   Glucose by meter    Collection Time:  "07/02/24  4:54 PM   Result Value Ref Range    GLUCOSE BY METER POCT 245 (H) 70 - 99 mg/dL   Blood gas venous    Collection Time: 07/02/24  6:18 PM   Result Value Ref Range    pH Venous 7.43 7.32 - 7.43    pCO2 Venous 40 40 - 50 mm Hg    pO2 Venous 55 (H) 25 - 47 mm Hg    Bicarbonate Venous 27 21 - 28 mmol/L    Base Excess/Deficit Venous 2.5 -3.0 - 3.0 mmol/L    FIO2 0     Oxyhemoglobin Venous 89 (H) 70 - 75 %    O2 Sat, Venous 90.6 (H) 70.0 - 75.0 %   Glucose by meter    Collection Time: 07/02/24  9:40 PM   Result Value Ref Range    GLUCOSE BY METER POCT 271 (H) 70 - 99 mg/dL   Glucose by meter    Collection Time: 07/03/24  7:32 AM   Result Value Ref Range    GLUCOSE BY METER POCT 291 (H) 70 - 99 mg/dL          Physical and Psychiatric Examination:     /81 (BP Location: Left arm)   Pulse 88   Temp 98.1  F (36.7  C) (Oral)   Resp 18   Ht 1.6 m (5' 3\")   Wt 148.7 kg (327 lb 14.4 oz)   SpO2 95%   BMI 58.08 kg/m    Weight is 327 lbs 14.4 oz  Body mass index is 58.08 kg/m .    Physical Exam:  I have reviewed the physical exam as documented by by the medical team and agree with findings and assessment and have no additional findings to add at this time.         MSE:   Alert and oriented. Insight and judgement are intact. Denies SI/SIB, as well as AH/VH. Speech is appropriate and patient is engaged in conversation.          DSM-5 Diagnosis:   Other Unspecified and Specified Bipolar and Related Disorder 296.80 (F31.9) Unspecified Bipolar and Related Disorder, r/o BPII, currently depressed  Generalized Anxiety Disorder          Assessment:   Psychiatry seeing patient today regarding anxiety. She reports previous assessments that specified her symptoms were consistent with \"bipolar tendencies\", including irritability, impulsivity, anger, racing thoughts. She has previous trials of Sertraline and Paxil, which were not effective in managing her symptoms. Lamictal was helpful at lower doses, but doses nearing 200 mg " daily caused an increase in anxiety and memory difficulties. Given that she has two failed trials of SSRIs, I would be inclined to think that patient's symptoms are consistent with Bipolar II Disorder. Her symptoms appear less severe than would be present with Bipolar I Disorder, including hypomanic symptoms vs nils.             Summary of Recommendations:   1) Ordered Abilify 2 mg daily in the morning to help target depression and mood symptoms. Can increase to 5 mg daily after one week.     2) Outpatient psychiatry and therapy resources placed in patient's AVS.    3) Today Aniyah does not show any symptoms of acute nils, nor suicidal or homicidal ideations. Therefore, based on all available evidence including the factors cited above, he does not appear to be at imminent risk for self-harm, does not meet criteria for a 72-hr hold, and therefore remains appropriate for ongoing outpatient level of care.     Additional steps taken to minimize risk include: medication optimization, close psychiatric follow up and crisis resources. Resources for outpatient care was offered and patient accepted these offers.            Page me or re-consult psychiatry as needed.       Ambika Terrell, BETHANIE, APRN  Consult/Liaison Psychiatry  [facility]   Contact information available via [facility] Paging/Directory.  If I am not available, please call [facility] intake ([phone])

## 2024-07-03 NOTE — CONSULTS
PULMONARY / CRITICAL CARE CONSULT NOTE    Date / Time of Admission:  7/1/2024  3:20 PM    Assessment:      Aniyah Delgado is a 44 year old female with history of HTN, paroxysmal atrial fibrillation, anticoagulated, asthma, angioedema (requiring intubation Nov/2023, 2/2024 and 3/8/2024, seen by Allergy at [facility] ER, ? Mast cell activation syndrome), anxiety disorder, fibromyalgia, GERD, DM, hypothyroidism, ESME, obesity Body mass index is 58.08 kg/m .  Patient was driving in the car to the airport, very stressed about going to the wrong terminal, having peanut butter M&M and suddenly developed shortness of breath, lips were swollen, reports nausea, ongoing cough. Patient pulled over on the road and called 911.   Upon ED evaluation, patient was in moderate distress, audible wheezes, oral examination per ED, normal lip and tongue, mild swelling of uvula and soft palate.   Patient received racemic epi, albuterol nebs, solumedrol. CXR showed no infiltrates.   Labs showed acute on chronic respiratory acidosis, mild elevated BUN/Cr, mild elevated CRP, high TSH, normal free T4, normal WBC, negative viral panel PCR.   Patient was admitted with diagnosis of angioedema, asthma exacerbation.   Pulmonary service consulted.     Angioedema  Requiring intubation Nov/2023, 2/2024 and 3/8/2024  Follows by Allergy at [facility] ER, ? Mast cell activation syndrome.   Per ED exam, normal lips and tongue, mild swelling of uvula and soft palate, diffuse wheezes.   Prior to incident, patient was under a lot of stress, reports having peanut butter M&M and chips, then cough and nausea. Denies food allergies.   Received racemic epi, albuterol nebs, solumedrol.   Clinically better . Tryptase, C1 and C4 were ordered on admission.   Continue steroids for 5 days, H1 blocker and H2 blocker.   Continue montelukast.   Asthma   Symptoms are stable  Resume ICS/LABA  Sleep apnea  HTN  Paroxysmal atrial fibrillation , anticoagulated  GERD  Hypothyroidism    Fibromyalgia   Anxiety disorder  Obesity Body mass index is 58.08 kg/m .    Advance Directives:  Full code    Plan:   Titrate FiO2 to keep SpO2 > 90%, currently on RA  Continue systemic steroids, prednisone 40 mg for 5 days   Continue H1 and H2 blocker  Titrate BP meds  Glucose level monitoring   Synthroid supplementation   H2 blocker for GI prophylaxis  DVT prophylaxis anticoagulated with rivaroxaban  Psy evaluation   Patient follows with Pulmonary at [facility], regarding sleep apnea and asthma    Please contact me if you have any questions.      Dez Cobian  Pulmonary / Critical Care  07/03/2024  9:59 AM      Reason for consult : angiogema, asthma   HPI:  Aniyah Delgado is a 44 year old female with history of HTN, paroxysmal atrial fibrillation, anticoagulated, asthma, angioedema (requiring intubation Nov/2023, 2/2024 and 3/8/2024, seen by Allergy at [facility], ? Mast cell activation syndrome), anxiety disorder, fibromyalgia, GERD, DM, hypothyroidism, ESME, obesity Body mass index is 58.08 kg/m .  Patient was driving in the car to the airport, very stressed about going to the wrong terminal, having peanut butter M&M and suddenly developed shortness of breath, lips were swollen, reports nausea, ongoing cough. Patient pulled over on the road and called 911.   Upon ED evaluation, patient was in moderate distress, audible wheezes, oral examination per ED, normal lip and tongue, mild swelling of uvula and soft palate.   Patient received racemic epi, albuterol nebs, solumedrol. CXR showed no infiltrates.   Labs showed acute on chronic respiratory acidosis, mild elevated BUN/Cr, mild elevated CRP, high TSH, normal free T4, normal WBC, negative viral panel PCR.   Patient was admitted with diagnosis of angioedema, asthma exacerbation.   Pulmonary service consulted.     PMH  HTN, paroxysmal atrial fibrillation, anticoagulated, asthma, angioedema (requiring intubation Nov/2023, 2/2024 and 3/8/2024, seen by  Allergy at [facility], ? Mast cell activation syndrome), anxiety disorder, rheumatoid arthritis, fibromyalgia, GERD, DM, hypothyroidism, ESME, obesity Body mass index is 58.08 kg/m .    Allergies: Augmentin [amoxicillin-pot clavulanate], Lisinopril, Alprazolam, and Diltiazem     MEDS:  Current Facility-Administered Medications   Medication Dose Route Frequency Provider Last Rate Last Admin    acetaminophen (TYLENOL) tablet 650 mg  650 mg Oral QAM Hero Red MD   650 mg at 07/03/24 0826    albuterol (PROVENTIL HFA/VENTOLIN HFA) inhaler  1-2 puff Inhalation Q4H PRN eHro Red MD        carvedilol (COREG) tablet 12.5 mg  12.5 mg Oral BID w/meals Hero Red MD   12.5 mg at 07/03/24 0827    cetirizine (zyrTEC) tablet 20 mg  20 mg Oral BID Hero Red MD   20 mg at 07/03/24 0826    glucose gel 15-30 g  15-30 g Oral Q15 Min PRN Hero Red MD        Or    dextrose 50 % injection 25-50 mL  25-50 mL Intravenous Q15 Min PRN Hero Red MD        Or    glucagon injection 1 mg  1 mg Subcutaneous Q15 Min PRN Hero Red MD        hydrOXYzine HCl (ATARAX) tablet 25-50 mg  25-50 mg Oral Q6H PRN Hero Red MD   50 mg at 07/02/24 1148    insulin aspart (NovoLOG) injection (RAPID ACTING)  1-7 Units Subcutaneous TID AC Hero Red MD   4 Units at 07/03/24 0822    insulin aspart (NovoLOG) injection (RAPID ACTING)  1-5 Units Subcutaneous At Bedtime Hero Red MD   2 Units at 07/02/24 2152    insulin glargine (LANTUS PEN) injection 10 Units  10 Units Subcutaneous At Bedtime Hero Red MD   10 Units at 07/02/24 2151    ipratropium - albuterol 0.5 mg/2.5 mg/3 mL (DUONEB) neb solution 3 mL  1 vial Nebulization Q6H PRN Hero Red MD   3 mL at 07/03/24 0940    levothyroxine (SYNTHROID/LEVOTHROID) tablet 300 mcg  300 mcg Oral Daily Hero Red MD   300 mcg at 07/03/24 0827    LORazepam (ATIVAN) tablet 0.5 mg  0.5 mg Oral Q4H PRN Hero Red MD        methocarbamol (ROBAXIN) tablet  500 mg  500 mg Oral BID PRN Hero Red MD   500 mg at 07/02/24 2013    montelukast (SINGULAIR) tablet 10 mg  10 mg Oral QAM AC Hero Red MD   10 mg at 07/03/24 0825    naloxone (NARCAN) injection 0.2 mg  0.2 mg Intravenous Q2 Min PRN Hero Red MD        Or    naloxone (NARCAN) injection 0.4 mg  0.4 mg Intravenous Q2 Min PRN Hero Red MD        Or    naloxone (NARCAN) injection 0.2 mg  0.2 mg Intramuscular Q2 Min PRN Hero Red MD        Or    naloxone (NARCAN) injection 0.4 mg  0.4 mg Intramuscular Q2 Min PRN Hero Red MD        ondansetron (ZOFRAN ODT) ODT tab 4 mg  4 mg Oral Q6H PRN Hero Red MD   4 mg at 07/02/24 1038    Or    ondansetron (ZOFRAN) injection 4 mg  4 mg Intravenous Q6H PRN Hero Red MD   4 mg at 07/02/24 1712    Patient is already receiving anticoagulation with heparin, enoxaparin (LOVENOX), warfarin (COUMADIN)  or other anticoagulant medication   Does not apply Continuous PRN Hero Red MD        predniSONE (DELTASONE) tablet 40 mg  40 mg Oral Daily Hero Red MD   40 mg at 07/03/24 0825    rivaroxaban ANTICOAGULANT (XARELTO) tablet 20 mg  20 mg Oral Daily with supper Hero Red MD   20 mg at 07/01/24 2124    senna-docusate (SENOKOT-S/PERICOLACE) 8.6-50 MG per tablet 1 tablet  1 tablet Oral BID PRN Hero Red MD        Or    senna-docusate (SENOKOT-S/PERICOLACE) 8.6-50 MG per tablet 2 tablet  2 tablet Oral BID PRN Hero Red MD        traMADol (ULTRAM) tablet 50 mg  50 mg Oral Q6H PRN Arvin Bowie DO   50 mg at 07/03/24 1059     Social History     Socioeconomic History    Marital status: Single     Spouse name: Not on file    Number of children: Not on file    Years of education: Not on file    Highest education level: Not on file   Occupational History    Not on file   Tobacco Use    Smoking status: Not on file    Smokeless tobacco: Not on file   Substance and Sexual Activity    Alcohol use: Not on file    Drug use: Not on  file    Sexual activity: Not on file   Other Topics Concern    Not on file   Social History Narrative    Not on file     Social Determinants of Health     Financial Resource Strain: High Risk (5/4/2023)    Received from [facility]    Overall Financial Resource Strain (CARDIA)     Difficulty of Paying Living Expenses: Hard   Food Insecurity: Food Insecurity Present (6/25/2024)    Received from [facility]    Hunger Vital Sign     Worried About Running Out of Food in the Last Year: Sometimes true     Ran Out of Food in the Last Year: Sometimes true   Transportation Needs: Unmet Transportation Needs (6/25/2024)    Received from [facility]    PRAPARE - Transportation     Lack of Transportation (Medical): Yes     Lack of Transportation (Non-Medical): Yes   Physical Activity: Inactive (6/25/2024)    Received from [facility]    Exercise Vital Sign     Days of Exercise per Week: 0 days     Minutes of Exercise per Session: 0 min   Stress: Stress Concern Present (5/4/2023)    Received from [facility]    [place] [place] [employer] - Occupational Stress Questionnaire     Feeling of Stress : To some extent   Social Connections: Moderately Isolated (5/4/2023)    Received from [facility]    Social Connection and Isolation Panel [NHANES]     Frequency of Communication with Friends and Family: More than three times a week     Frequency of Social Gatherings with Friends and Family: More than three times a week     Attends [employer] Services: 1 to 4 times per year     Active Member of Clubs or Organizations: No     Attends Club or Organization Meetings: Never     Marital Status: Never    Interpersonal Safety: Not At Risk (2/23/2024)    Received from [facility]    Humiliation, Afraid, Rape, and Kick questionnaire     Fear of Current or Ex-Partner: No     Emotionally Abused: No     Physically Abused: No     Sexually Abused: No   Housing Stability: Low Risk  (6/25/2024)    Received from [facility]    Housing  "Stability     What is your living situation today?: I have a steady place to live     History reviewed. No pertinent family history.    ROS  - Twelve point review of systems were discussed with patient, positive finding in HPI    Objective:   VITALS:  /81 (BP Location: Left arm)   Pulse 85   Temp 98.1  F (36.7  C) (Oral)   Resp 18   Ht 1.6 m (5' 3\")   Wt 148.7 kg (327 lb 14.4 oz)   SpO2 95%   BMI 58.08 kg/m    VENT:  Resp: 18    EXAM:   Gen: obese, awake, alert, tearful, no distress  HEENT: pink conjunctiva, moist mucosa, Mallampati III/IV  Neck: no thyromegaly, masses or JVD  Lungs: faint expiratory wheezes otherwise clear  CV: regular, no murmurs or gallops appreciated  Abdomen: soft, NT, BS wnl  Ext: no edema  Neuro: CN II-XII intact, non focal       Data Review:  Recent Labs   Lab 07/03/24  0732 07/02/24  2140 07/02/24  1654 07/02/24  1302 07/02/24  0744 07/02/24  0634   * 271* 245* 241* 234* 239*        07/01/24 15:38 07/02/24 18:18   FIO2 0 0   Ph Venous 7.32 7.43   PCO2 Venous 63 (H) 40   PO2 Venous 26 55 (H)   O2 Sat, Venous 44.1 (L) 90.6 (H)   Bicarbonate Venous 33 (H) 27   Base Excess Venous 6.7 (H) 2.5   Oxyhemoglobin Venous 43 (L) 89 (H)      07/01/24 15:38 07/02/24 06:34   Sodium 140 137   Potassium 3.9 3.9   Chloride 101 100   Carbon Dioxide (CO2) 30 (H) 25   Urea Nitrogen 11.5 12.4   Creatinine 1.20 (H) 0.91   GFR Estimate 57 (L) 80   Calcium 9.4 9.2   Anion Gap 9 12   Albumin 4.1 3.9   Protein Total 7.1 6.8   Alkaline Phosphatase 87 80   ALT 19 13   AST 28 22   Bilirubin Total 0.5 0.4   CRP Inflammation 9.29 (H)    Glucose 130 (H) 239 (H)   HCG Qualitative Serum Negative    Hemoglobin A1C 6.8 (H)    N-Terminal Pro BNP Inpatient <36    T4 Free 0.91    Troponin T, High Sensitivity 9    TSH 44.80 (H)       07/01/24 15:38 07/02/24 06:34   WBC 6.2 7.0   Hemoglobin 15.3 14.9   Hematocrit 45.8 43.5   Platelet Count 281 255   RBC Count 5.02 4.80   MCV 91 91   MCH 30.5 31.0   MCHC 33.4 " 34.3   RDW 12.9 12.6   % Neutrophils 53 92   % Lymphocytes 31 7   % Monocytes 6 0   % Eosinophils 9 0   % Basophils 1 0      07/01/24 15:44   SARS CoV2 PCR Negative   Influenza A Negative   Influenza B Negative   Resp Syncytial Virus Negative     XR CHEST PORT 1 VIEW  LOCATION: [facility]  DATE: 7/1/2024   INDICATION: Respiratory distress  COMPARISON: None.                IMPRESSION: Negative chest.    CT CHEST ANGIOGRAM AND PULMONARY ARTERIES WITH IV CONTRAST   Including 3D image postprocessing with or without AI assistance.   COMPARISON: May 28, 2023.   FINDINGS:   No evidence of pulmonary embolism. No consolidation, effusion, or adenopathy.   IMPRESSION  Negative for acute pulmonary embolism. No acute abnormalities.     By:  Dez Cobian MD, 07/03/2024  9:59 AM    Primary Care Physician:  System, Provider Not In

## 2024-07-03 NOTE — PLAN OF CARE
Goal Outcome Evaluation:      Plan of Care Reviewed With: patient  I Illness Severity: Watcher    PPatient Summary:      Telemetry Orders: Yes    Interpretation of Cardiac Rhythm: NSR      Dyspnea improved and O2 sats >88% at RA or at prior home O2 therapy level:  Oxygen Sats are 96% on Room Air when Awake. Requires oxygen per NC at night 3 to 4 LPM    Last Bowel Movement: 07/03/24    Acute Symptoms or Concerns: monitoring for any Angioedema Symptoms.    Is this a new or worsening symptom or concern? Yes    Is this symptom or concern being adequately managed? Yes    Shift Summary: Patient is alert and oriented and able to communicate her needs to staff. Patient has been up to the bathroom and took a hot shower. Patient stated this helped her lower back pain. Blood Sugars were 291 and 324.  A1C was 6.8. Dr. Red informed of blood sugar levels. Medicated with Ultram for c/o headache. Voiding adequate amounts and had a bowel movement last night. No s/s of angioedema or breathing problems. Telemetry shows NSR. Will continue to monitor.     AActions:    Diagnostic testing and/or procedures completed, and results are available for discharge:  Met    SSituational Awareness:      Anticipated post discharge treatment plan formulated and the following needs have been identified:   home oxygen    SSynthesis:     Was bedside rounding completed on your shift? Yes     What team members present during bedside rounds?  JODY

## 2024-07-03 NOTE — DISCHARGE INSTRUCTIONS
ADDITIONAL SUPPORTS:  Call or text 962 - National Suicide & Crisis Lifeline - if you feel like you may be in crisis or having thoughts of suicide.    [employer] [place] [employer] [place] ([place]) provides support groups and educational programs. Visit www.namimn.org Helpline at 1-343.519.7405 or 934-594-0149 for further information.   [facility] ([employer] [place] [employer] Illness) improves the lives of children and adults with mental illnesses and their families by providing free classes on mental illnesses andsupport groups for adults with mental illnesses, parents and family members.   For more information:  Phone: [phone]  Toll free: 8-096-NWMV-HELPS  Website: www.namihelps.org      The [place] [facility] provides a [id number] approach to mental health recovery, support and wellness. Calls are answered by our team of professionally trained Certified Peer Specialists, who have first hand experience living with a mental health condition.   Open Monday-Saturday, 5pm to 10pm. Call [phone].       You may contact the [facility] for brief, short-term solution focused therapy support with your mental health goals. Call [phone] for more information or to schedule. (Virtual Appointments)          [facility]: Thank you for your interest in [facility]. Currently, patients are experiencing long waits for intake when referred within [facility]. Please know that you may contact your insurance carrier member services to learn more about scheduling in network therapy. Your insurance company will have lists of in network therapists that are not within [facility] and may have more immediate availability.       Get care started with an ongoing therapist by calling to schedule your intake at one of the following clinics:    [facility]: [phone]  [facility]  [phone]  [facility] [phone]  [facility] [phone]  [facility] [phone]  [facility]  [phone]   [facility] in [facility] [phone]  [facility] [phone]    Call an [facility] Behavioral Coordinator at [phone] for assistance in scheduling mental health appointments (Psychiatry/medication management, therapy, support groups, neuropsych testing, intake for programmatic care such as IOP/PHP program, etc.)

## 2024-07-03 NOTE — PLAN OF CARE
I Illness Severity: Watcher    PPatient Summary:      Telemetry Orders: Yes    Interpretation of Cardiac Rhythm: NSR     Dyspnea improved and O2 sats >88% at RA or at prior home O2 therapy level:  No    Last Bowel Movement:     Acute Symptoms or Concerns: tongue swelling, respiratory distress, anxiety    Is this a new or worsening symptom or concern? No    Is this symptom or concern being adequately managed? Yes    Shift Summary: See RRT notes. Pt now resting comfortably. Pain controlled with PRN robaxin. Pt states anxiety controlled by ativan. Up independently. Visitor exception in place to help with anxiety.     AActions:    Diagnostic testing and/or procedures completed, and results are available for discharge:  Not Met    SSituational Awareness:      Anticipated post discharge treatment plan formulated and the following needs have been identified:   Psych and Pulm consults pending.     SSynthesis:     Was bedside rounding completed on your shift? No     What team members present during bedside rounds?

## 2024-07-03 NOTE — PLAN OF CARE
I Illness Severity: Watcher    PPatient Summary:      Telemetry Orders: Yes    Interpretation of Cardiac Rhythm: NSR    Dyspnea improved and O2 sats >88% at RA or at prior home O2 therapy level:  No    Last Bowel Movement:     Acute Symptoms or Concerns: angioedema vs asthma exacerbation    Is this a new or worsening symptom or concern? No    Is this symptom or concern being adequately managed? Yes    Shift Summary: Pt Aox4. Slept very soundly throughout the night. Denied pain and anxiety. No PRNs utilized this shift. VSS on 3-4L NC while asleep.     AActions:    Diagnostic testing and/or procedures completed, and results are available for discharge:  Not Met    SSituational Awareness:      Anticipated post discharge treatment plan formulated and the following needs have been identified:   Pending clinical progression    SSynthesis:     Was bedside rounding completed on your shift? No     What team members present during bedside rounds?

## 2024-07-03 NOTE — PROGRESS NOTES
[facility]    Medicine Progress Note - Hospitalist Service    Date of Admission:  7/1/2024    Assessment & Plan   Aniyah Delgado is a 44 year old female admitted on 7/1/2024.  She has a past medical history significant for hypertension, diabetes, angioedema, asthma who presented to the hospital with shortness of breath, cough, possible lips and tongue swelling.  She was diagnosed with acute angioedema attack versus asthma exacerbation.  In the ED received methylprednisone, racepinephrine with resolution of her symptoms within a few hours.  7/2, rapid response called again around 5:30 PM for recurrences of shortness of breath, cough, vomiting, sensation of tongue and lip swelling.  Received epinephrine, methylprednisone, famotidine and Benadryl.  Symptoms resolved within 5 minutes.  Pulmonology consulted.  Possible discharge on 7/4/2024 if symptoms are well-controlled.    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  -CRP is mildly elevated.  -Patient was admitted for IMCU for closer monitoring.  -No further episodes of angioedema over the last 12 hours.    Plan  -Appreciate pulmonology recommendations--> continue prednisone 40 mg for 5 days  -Continue home Zyrtec 20 mg twice daily  -Continue home famotidine 40 mg twice daily  -Continue home hydroxyzine  -Continue home Benadryl as needed  -Continue to monitor symptoms very closely patient is high risk for recurrent angioedema.   -Educated about the importance of avoiding any possible triggers such as food    Asthma  Intermittent hypoxic respiratory failure  -ED reported wheezing on admission, wheezing has completely resolved within a few hours.  -Saturating well on room air.  -Suspicion of sleep apnea.   Nocturnal hypoxia.    Plan  -Continue home inhalers or equivalent  -Continue home montelukast 10 mg daily  -Wean off supplemental oxygen as tolerated  -Needs sleep study as an outpatient.    Hypothyroidism  -TSH elevated around 44 with borderline T4 level.    Plan  -Continue Synthroid 300 mcg daily  -Will curbside endocrinology.    Hypertension  -At home on Coreg 12.5 mg twice daily, Dyazide 37/25 mg daily  -Blood pressures currently around 140/80    Plan  -Continue home on Coreg with holding parameters  -Restart home Dyazide    Acute kidney injury  -Baseline creatinine around 0.9 creatinine on presentation 1.2  -Creatinine is back to baseline.    Plan   -No further intervention    Diabetes  Stress-induced hyperglycemia  -At home on Trulicity  -Fingerstick glucose trending up over the last 48 hours most likely due to steroid.  -Started on Lantus 10 units on 7/2.    Plan  -Increase Lantus from 10 units to 18 units nightly.  -Give lispro 6 units now.  -Continue sliding scale insulin  -If glucose remains uncontrolled we will add mealtime insulin.  -Continue to hold home Trulicity.    Anxiety  Depression  Fibromyalgia  -Per patient she was on Lyrica and an SSRI for fibromyalgia, however, it was discontinued as it was causing side effect of weight gain without any significant improvement of symptoms.  -Reported increased anxiety and uncontrolled symptoms of fibromyalgia over the last few weeks.  -Unclear if her anxiety is contributing to her recurrent symptoms of possible angioedema.?  Anxiety attack.  -7/3, patient appears less anxious.  She stated that she is feeling well.    Plan  -Psychiatry consulted on 7/2.  Pending evaluation.  -Ativan 0.5 mg as needed for anxiety.  -Continue Robaxin.    Atrial fibrillation  -Recent history of atrial fibrillation, she was supposed to be on Xarelto, however, noncompliant.  -A-fib is not confirmed.    Plan  -Continue home Xarelto 20 mg nightly              Diet: Combination Diet No  "Caffeine Diet, Low Saturated Fat Na <2400mg Diet    DVT Prophylaxis: DOAC  Ortiz Catheter: Not present  Lines: None     Cardiac Monitoring: ACTIVE order. Indication: Recurrent angioedema  Code Status: Full Code      Clinically Significant Risk Factors                                # DMII: A1C = 6.8 % (Ref range: <5.7 %) within past 6 months, PRESENT ON ADMISSION  # Severe Obesity: Estimated body mass index is 58.08 kg/m  as calculated from the following:    Height as of this encounter: 1.6 m (5' 3\").    Weight as of this encounter: 148.7 kg (327 lb 14.4 oz)., PRESENT ON ADMISSION     # Asthma: noted on problem list        Disposition Plan     Medically Ready for Discharge: Anticipated Tomorrow             HOLLIS NEAL MD  Hospitalist Service  [facility]  Securely message with [employer] (more info)  Text page via [facility] Paging/Directory   ______________________________________________________________________    Interval History   No significant events.  No further episodes of possible angioedema.  Feeling better.  Denies any chest pain or shortness of breath.  Denies any lightheadedness.  Denies any nausea or vomiting.  Denies any abdominal pain.    Physical Exam   Vital Signs: Temp: 98.7  F (37.1  C) Temp src: Oral BP: 138/80 Pulse: 80   Resp: 18 SpO2: 96 % O2 Device: None (Room air) Oxygen Delivery: 4 LPM  Weight: 327 lbs 14.4 oz  Exam this morning    Physical Exam  Constitutional:       General: She is not in acute distress.     Appearance: She is obese. She is not ill-appearing or toxic-appearing.   HENT:      Head:      Comments: No tongue or lip swelling.     Mouth/Throat:      Mouth: Mucous membranes are moist.   Cardiovascular:      Rate and Rhythm: Normal rate.   Pulmonary:      Effort: Pulmonary effort is normal. No respiratory distress.      Breath sounds: Normal breath sounds. No wheezing.   Skin:     General: Skin is warm and dry.   Neurological:      Mental Status: She is alert. "   Psychiatric:         Mood and Affect: Mood normal.         Behavior: Behavior normal.          Medical Decision Making       51 MINUTES SPENT BY ME on the date of service doing chart review, history, exam, documentation & further activities per the note.      Data         Imaging results reviewed over the past 24 hrs:   No results found for this or any previous visit (from the past 24 hour(s)).

## 2024-07-04 VITALS
SYSTOLIC BLOOD PRESSURE: 158 MMHG | WEIGHT: 293 LBS | TEMPERATURE: 98.4 F | RESPIRATION RATE: 18 BRPM | HEIGHT: 63 IN | HEART RATE: 70 BPM | OXYGEN SATURATION: 92 % | DIASTOLIC BLOOD PRESSURE: 105 MMHG | BODY MASS INDEX: 51.91 KG/M2

## 2024-07-04 LAB — GLUCOSE BLDC GLUCOMTR-MCNC: 139 MG/DL (ref 70–99)

## 2024-07-04 PROCEDURE — 250N000013 HC RX MED GY IP 250 OP 250 PS 637: Performed by: REGISTERED NURSE

## 2024-07-04 PROCEDURE — 250N000013 HC RX MED GY IP 250 OP 250 PS 637: Performed by: INTERNAL MEDICINE

## 2024-07-04 PROCEDURE — 99239 HOSP IP/OBS DSCHRG MGMT >30: CPT | Performed by: STUDENT IN AN ORGANIZED HEALTH CARE EDUCATION/TRAINING PROGRAM

## 2024-07-04 PROCEDURE — 250N000012 HC RX MED GY IP 250 OP 636 PS 637: Performed by: STUDENT IN AN ORGANIZED HEALTH CARE EDUCATION/TRAINING PROGRAM

## 2024-07-04 PROCEDURE — 250N000013 HC RX MED GY IP 250 OP 250 PS 637: Performed by: STUDENT IN AN ORGANIZED HEALTH CARE EDUCATION/TRAINING PROGRAM

## 2024-07-04 RX ORDER — METHOCARBAMOL 500 MG/1
500 TABLET, FILM COATED ORAL 2 TIMES DAILY PRN
Qty: 14 TABLET | Refills: 0 | Status: SHIPPED | OUTPATIENT
Start: 2024-07-04

## 2024-07-04 RX ORDER — ARIPIPRAZOLE 2 MG/1
2 TABLET ORAL DAILY
Qty: 30 TABLET | Refills: 0 | Status: SHIPPED | OUTPATIENT
Start: 2024-07-05

## 2024-07-04 RX ORDER — PREDNISONE 20 MG/1
40 TABLET ORAL DAILY
Qty: 8 TABLET | Refills: 0 | Status: SHIPPED | OUTPATIENT
Start: 2024-07-05 | End: 2024-07-09

## 2024-07-04 RX ORDER — EPINEPHRINE 0.3 MG/.3ML
0.3 INJECTION SUBCUTANEOUS PRN
Qty: 2 EACH | Refills: 0 | Status: SHIPPED | OUTPATIENT
Start: 2024-07-04

## 2024-07-04 RX ADMIN — CETIRIZINE HYDROCHLORIDE 20 MG: 10 TABLET, FILM COATED ORAL at 08:52

## 2024-07-04 RX ADMIN — TRIAMTERENE AND HYDROCHLOROTHIAZIDE 1 CAPSULE: 37.5; 25 CAPSULE ORAL at 08:52

## 2024-07-04 RX ADMIN — MONTELUKAST 10 MG: 10 TABLET, FILM COATED ORAL at 06:43

## 2024-07-04 RX ADMIN — CARVEDILOL 12.5 MG: 6.25 TABLET, FILM COATED ORAL at 08:52

## 2024-07-04 RX ADMIN — PREDNISONE 40 MG: 20 TABLET ORAL at 08:52

## 2024-07-04 RX ADMIN — LEVOTHYROXINE SODIUM 300 MCG: 0.15 TABLET ORAL at 08:52

## 2024-07-04 RX ADMIN — ARIPIPRAZOLE 2 MG: 2 TABLET ORAL at 08:52

## 2024-07-04 RX ADMIN — FAMOTIDINE 40 MG: 20 TABLET, FILM COATED ORAL at 08:52

## 2024-07-04 ASSESSMENT — ACTIVITIES OF DAILY LIVING (ADL)
ADLS_ACUITY_SCORE: 37
ADLS_ACUITY_SCORE: 20
ADLS_ACUITY_SCORE: 37
ADLS_ACUITY_SCORE: 20
ADLS_ACUITY_SCORE: 37

## 2024-07-04 NOTE — PLAN OF CARE
Goal Outcome Evaluation:      Plan of Care Reviewed With: patient    Overall Patient Progress: improvingOverall Patient Progress: improving       Problem: Adult Inpatient Plan of Care  Goal: Absence of Hospital-Acquired Illness or Injury  Intervention: Prevent Skin Injury  Intervention: Monitor and Manage Glycemia  Shift Summary.   VSS on RA. SOB has improved. Lung sounds are CL. Denies pain for me. PRN ativan given for anxiety. IND with transfer in her room. A$O x 4. Calm and Cooperative. Possible discharge today.

## 2024-07-04 NOTE — PLAN OF CARE
Goal Outcome Evaluation:      Plan of Care Reviewed With: patient          Outcome Evaluation: VSS on RA. denies swelling, SHEIKH improved. Showered. NSR on telemetry.    I Illness Severity: Discharging    PPatient Summary:      Telemetry Orders: No    Interpretation of Cardiac Rhythm: NSR    Dyspnea improved and O2 sats >88% at RA or at prior home O2 therapy level:  Yes    Last Bowel Movement: 7/3    Acute Symptoms or Concerns: NONE    Is this a new or worsening symptom or concern? No    Is this symptom or concern being adequately managed? Yes    Shift Summary: SEE ABOVE     AActions:    Diagnostic testing and/or procedures completed, and results are available for discharge:  Met    SSituational Awareness:      Anticipated post discharge treatment plan formulated and the following needs have been identified:   None identified    SSynthesis:     Was bedside rounding completed on your shift? Yes     What team members present during bedside rounds?  RN and MD

## 2024-07-04 NOTE — DISCHARGE SUMMARY
"[facility]  Hospitalist Discharge Summary      Date of Admission:  7/1/2024  Date of Discharge:  7/4/2024  Discharging Provider: HOLLIS NEAL MD  Discharge Service: Hospitalist Service    Discharge Diagnoses   Angioedema/Mast cell activation syndrome  Asthma  Intermittent hypoxic respiratory failure  Hypothyroidism  Acute kidney injury    Clinically Significant Risk Factors     # DMII: A1C = 6.8 % (Ref range: <5.7 %) within past 6 months    # Severe Obesity: Estimated body mass index is 58.05 kg/m  as calculated from the following:    Height as of this encounter: 1.6 m (5' 3\").    Weight as of this encounter: 148.6 kg (327 lb 11.2 oz).       Follow-ups Needed After Discharge   Follow-up Appointments     Follow-up and recommended labs and tests       Follow up with primary care provider, Provider Not In System, within 7   days for hospital follow- up.  Sleep study, further discussion regarding   management of hypothyroidism.  Follow-up with your allergist within the   next 1 to 2 weeks.            Unresulted Labs Ordered in the Past 30 Days of this Admission       Date and Time Order Name Status Description    7/3/2024 12:15 PM Tryptase In process         These results will be followed up by PCP    Discharge Disposition   Discharged to home  Condition at discharge: Stable    Hospital Course   Aniyah Delgado is a 44 year old female admitted on 7/1/2024.  She has a past medical history significant for hypertension, diabetes, angioedema, asthma who presented to the hospital with shortness of breath, cough, possible lips and tongue swelling.  She was diagnosed with acute angioedema attack versus asthma exacerbation. In the ED received methylprednisone, racepinephrine with resolution of her symptoms within a few hours.  On 7/2, patient was eating her dinner when she had another episode of sudden onset shortness of breath, cough, vomiting, and sensation of tongue and lip swelling.  She received " methylprednisone, famotidine and Benadryl.  Symptoms resolved within a few minutes.  She was evaluated by pulmonologist.  Also, evaluated by psychiatry for ongoing uncontrolled anxiety.  No further episodes of possible angioedema over the last 48 hours.  Patient is feeling well.  Vitals are stable.  She was discharged home.    Angioedema/Mast cell activation syndrome  History of recurrent angioedema requiring hospitalization and intubation  Asthma exacerbation  -C1 and C4 complement already checked  -Tryptase checked in 2/2024 which was normal  -Per documentation, she follows up with allergy at [facility].  -CRP is mildly elevated.    Plan  -Continue prednisone 40 mg for 4 more days  -Continue home Zyrtec 20 mg twice daily  -Continue home famotidine 40 mg twice daily  -Continue home hydroxyzine  -Continue home Benadryl as needed  -New epinephrine pen prescription sent to a pharmacy that is open 24 hours in [facility].  -Follow-up with her allergy clinic as an outpatient.    Asthma  Intermittent hypoxic respiratory failure  -ED reported wheezing on admission, wheezing has completely resolved within a few hours.  -Saturating well on room air.  -Suspicion of sleep apnea. Nocturnal hypoxia.    Plan  -Continue home inhalers or equivalent  -Continue home montelukast 10 mg daily  -Needs sleep study as an outpatient.     Hypothyroidism  -TSH elevated around 44 with borderline T4 level.    Plan  -Continue Synthroid 300 mcg daily.  Patient is already on a higher dose of Synthroid.  -Follow-up with primary care physician for further evaluation and management of hypothyroidism.    Hypertension  -At home on Coreg 12.5 mg twice daily, Dyazide 37/25 mg daily  -Blood pressures currently around 140/80    Plan  -Continue home antihypertensives on discharge.    Acute kidney injury  -Baseline creatinine around 0.9 creatinine on presentation 1.2  -Creatinine is back to baseline.    Plan   -No further  intervention    Diabetes  Stress-induced hyperglycemia  -At home on Trulicity  -Has a prescription for Humulin 10 to 20 units twice daily while using steroid.    Plan  -Continue home Trulicity  -Continue home Humulin suggested that the patient takes 10 units twice daily and adjust as needed based on fingerstick glucose.  Educated about the importance of monitoring her glucose level and avoid hypoglycemia.    Anxiety  Depression  Fibromyalgia  -Per patient she was on Lyrica and an SSRI for fibromyalgia, however, it was discontinued as it was causing side effect of weight gain without any significant improvement of symptoms.  -Reported increased anxiety and uncontrolled symptoms of fibromyalgia over the last few weeks.  -Unclear if her anxiety is contributing to her recurrent symptoms of possible angioedema.?  Anxiety attack.  -Evaluated by psychiatry, she was started on Abilify.    Plan  -Start Abilify on discharge  -Discharged on limited supply of Robaxin    Atrial fibrillation  -Recent history of atrial fibrillation, she was supposed to be on Xarelto, however, noncompliant.  -A-fib is not confirmed.  Per patient, she is supposed to be on a cardiac heart monitor as an outpatient.    Plan  -Continue home Xarelto 20 mg nightly  -Follow-up with primary care physician for further evaluation of atrial fibrillation        Consultations This Hospital Stay   PULMONARY IP CONSULT  PSYCHIATRY IP CONSULT    Code Status   Full Code    Time Spent on this Encounter   I, HOLLIS NEAL MD, personally saw the patient today and spent greater than 30 minutes discharging this patient.       HOLLIS NEAL MD  [facility]  [address] [address]  Phone: [phone]  Fax: [phone]  ______________________________________________________________________    Physical Exam   Vital Signs: Temp: 98.4  F (36.9  C) Temp src: Oral BP: (!) 158/105 Pulse: 70   Resp: 18 SpO2: 92 % O2  Device: None (Room air)    Weight: 327 lbs 11.2 oz  Physical Exam  Constitutional:       General: She is not in acute distress.     Appearance: She is obese. She is not toxic-appearing.   Pulmonary:      Effort: Pulmonary effort is normal. No respiratory distress.      Breath sounds: No wheezing.   Skin:     General: Skin is warm and dry.   Neurological:      Mental Status: She is alert.   Psychiatric:         Mood and Affect: Mood normal.         Thought Content: Thought content normal.         Judgment: Judgment normal.             Primary Care Physician   Provider Not In System    Discharge Orders      Reason for your hospital stay    Shortness of breath, cough, possible tongue and lip swelling most likely due to angioedema.     Follow-up and recommended labs and tests     Follow up with primary care provider, Provider Not In System, within 7 days for hospital follow- up.  Sleep study, further discussion regarding management of hypothyroidism.  Follow-up with your allergist within the next 1 to 2 weeks.     Activity    Your activity upon discharge: activity as tolerated     Diet    Follow this diet upon discharge: Orders Placed This Encounter      Combination Diet No Caffeine Diet, Low Saturated Fat Na <2400mg Diet       Significant Results and Procedures   Most Recent 3 CBC's:  Recent Labs   Lab Test 07/02/24  0634 07/01/24  1538   WBC 7.0 6.2   HGB 14.9 15.3   MCV 91 91    281     Most Recent 3 BMP's:  Recent Labs   Lab Test 07/04/24  0811 07/03/24  2232 07/03/24  1553 07/02/24  0744 07/02/24  0634 07/01/24  2053 07/01/24  1538   NA  --   --   --   --  137  --  140   POTASSIUM  --   --   --   --  3.9  --  3.9   CHLORIDE  --   --   --   --  100  --  101   CO2  --   --   --   --  25  --  30*   BUN  --   --   --   --  12.4  --  11.5   CR  --   --   --   --  0.91  --  1.20*   ANIONGAP  --   --   --   --  12  --  9   RAKESH  --   --   --   --  9.2  --  9.4   * 279* 295*   < > 239*   < > 130*    < > =  values in this interval not displayed.   ,   Results for orders placed or performed during the hospital encounter of 07/01/24   XR Chest Port 1 View    Narrative    EXAM: XR CHEST PORT 1 VIEW  LOCATION: [facility]  DATE: 7/1/2024    INDICATION: Respiratory distress  COMPARISON: None.      Impression    IMPRESSION: Negative chest.       Discharge Medications   Current Discharge Medication List        START taking these medications    Details   ARIPiprazole (ABILIFY) 2 MG tablet Take 1 tablet (2 mg) by mouth daily  Qty: 30 tablet, Refills: 0    Associated Diagnoses: Anxiety      methocarbamol (ROBAXIN) 500 MG tablet Take 1 tablet (500 mg) by mouth 2 times daily as needed for muscle spasms  Qty: 14 tablet, Refills: 0    Associated Diagnoses: Back pain, unspecified back location, unspecified back pain laterality, unspecified chronicity      predniSONE (DELTASONE) 20 MG tablet Take 2 tablets (40 mg) by mouth daily for 4 days  Qty: 8 tablet, Refills: 0    Associated Diagnoses: Exacerbation of asthma, unspecified asthma severity, unspecified whether persistent; Angioedema, sequela           CONTINUE these medications which have CHANGED    Details   EPINEPHrine (ANY BX GENERIC EQUIV) 0.3 MG/0.3ML injection 2-pack Inject 0.3 mLs (0.3 mg) into the muscle as needed for anaphylaxis  Qty: 2 each, Refills: 0    Associated Diagnoses: Angioedema, sequela           CONTINUE these medications which have NOT CHANGED    Details   acetaminophen (TYLENOL) 650 MG CR tablet Take 650 mg by mouth every morning      albuterol (PROAIR HFA/PROVENTIL HFA/VENTOLIN HFA) 108 (90 Base) MCG/ACT inhaler Inhale 1-2 puffs into the lungs every 4 hours as needed      carvedilol (COREG) 12.5 MG tablet Take 12.5 mg by mouth 2 times daily (with meals)      cetirizine (ZYRTEC) 10 MG tablet Take 20 mg by mouth 2 times daily      diphenhydrAMINE (BENADRYL) 25 MG tablet Take 25-50 mg by mouth every 6 hours as needed for itching or  allergies      dulaglutide (TRULICITY) 1.5 MG/0.5ML pen Inject 1.5 mg Subcutaneous every 7 days      Dulaglutide (TRULICITY) 3 MG/0.5ML SOPN Inject 3 mg Subcutaneous every 7 days      famotidine (PEPCID) 40 MG tablet Take 40 mg by mouth 2 times daily      HUMULIN N KWIKPEN 100 UNIT/ML susp Inject 10-20 Units Subcutaneous 2 times daily (before meals) Inject 10-20 Units under the skin 2 (two) times a day as needed (with meals when taking prednisone).      hydrOXYzine HCl (ATARAX) 25 MG tablet Take 25-50 mg by mouth every 6 hours as needed for itching      ipratropium - albuterol 0.5 mg/2.5 mg/3 mL (DUONEB) 0.5-2.5 (3) MG/3ML neb solution Take 1 vial by nebulization every 6 hours as needed for wheezing or shortness of breath      magnesium oxide 400 MG tablet Take 400 mg by mouth daily      montelukast (SINGULAIR) 10 MG tablet Take 10 mg by mouth daily before breakfast      ondansetron (ZOFRAN ODT) 4 MG ODT tab Take 4 mg by mouth every 8 hours as needed for nausea or vomiting      rivaroxaban ANTICOAGULANT (XARELTO) 20 MG TABS tablet Take 20 mg by mouth daily (with dinner)      SYNTHROID 300 MCG tablet Take 300 mcg by mouth daily      triamcinolone (KENALOG) 0.1 % external cream Apply 1 Application topically 2 times daily as needed (eczema)      triamterene-HCTZ (DYAZIDE) 37.5-25 MG capsule Take 1 capsule by mouth daily      Vitamin D3 (VITAMIN D, CHOLECALCIFEROL,) 25 mcg (1000 units) tablet Take 1 tablet by mouth daily           STOP taking these medications       ibuprofen (ADVIL/MOTRIN) 800 MG tablet Comments:   Reason for Stopping:             Allergies   Allergies   Allergen Reactions    Augmentin [Amoxicillin-Pot Clavulanate]     Lisinopril     Alprazolam Other (See Comments)     Excess sedation    Diltiazem Dizziness      Spine appears normal, range of motion is not limited, no muscle or joint tenderness. Midline low back pain, pain referring to low back on straight leg raise but negative straight leg raise bilaterally, normal strength in legs.

## 2024-07-04 NOTE — PROGRESS NOTES
Care Management Discharge Note    Discharge Date: 07/04/2024       Discharge Disposition: Home    Discharge Services: None    Discharge DME: None    Discharge Transportation: family or friend will provide    Education Provided on the Discharge Plan: Yes AVS per bedside nurse     Persons Notified of Discharge Plans: patient    Patient/Family in Agreement with the Plan: yes    Handoff Referral Completed: Yes    Additional Information:    Pt discharging to home, family/friend transport.  Pt denied CM needs.    Syed Esteban RN

## 2024-07-04 NOTE — PLAN OF CARE
Problem: Gas Exchange Impaired  Goal: Optimal Gas Exchange  Outcome: Progressing    Problem: Anxiety  Goal: Anxiety Reduction or Resolution  Outcome: Progressing    Pt continuing to report anxiety partially relieved w/ prn ativan. Nonpharmacologic methods utilized as well w/ some results. Up independently in room. Visitor exception for significant other. C/o back pain relieved w/ prn tylenol & robaxin. Tele reading NSR. Likely discharge home tomorrow.

## 2024-07-05 LAB
ATRIAL RATE - MUSE: 84 BPM
DIASTOLIC BLOOD PRESSURE - MUSE: 105 MMHG
INTERPRETATION ECG - MUSE: NORMAL
P AXIS - MUSE: 63 DEGREES
PR INTERVAL - MUSE: 162 MS
QRS DURATION - MUSE: 80 MS
QT - MUSE: 372 MS
QTC - MUSE: 439 MS
R AXIS - MUSE: 68 DEGREES
SYSTOLIC BLOOD PRESSURE - MUSE: 147 MMHG
T AXIS - MUSE: 54 DEGREES
TRYPTASE SERPL-MCNC: 5.1 UG/L
VENTRICULAR RATE- MUSE: 84 BPM

## 2024-07-06 ENCOUNTER — PATIENT OUTREACH (OUTPATIENT)
Dept: CARE COORDINATION | Facility: CLINIC | Age: 44
End: 2024-07-06
Payer: MEDICAID

## 2024-07-06 NOTE — PROGRESS NOTES
Connected Care Resource Center:   [facility] Contact  [facility]/Voicemail     Clinical Data: Post-Discharge Outreach     Outreach attempted x 2.  Left message on patient's voicemail, providing [facility]'s central phone number of [phone] ([phone]) for questions/concerns and/or to schedule an appt with an [facility] provider, if they do not have a PCP.      Plan:  [facility] will do no further outreaches at this time.       Gricelda Mims MA  Connected Care Resource Center, [facility]    *Connected Care Resource Team does NOT follow patient ongoing. Referrals are identified based on internal discharge reports and the outreach is to ensure patient has an understanding of their discharge instructions.     Discharge instructions given. TR band removed from right wrist with 4x4 gauze and tegaderm applied to site. No bleeding or swelling noted from right brachial site and right wrist site.